# Patient Record
Sex: FEMALE | Race: AMERICAN INDIAN OR ALASKA NATIVE | NOT HISPANIC OR LATINO | Employment: UNEMPLOYED | URBAN - NONMETROPOLITAN AREA
[De-identification: names, ages, dates, MRNs, and addresses within clinical notes are randomized per-mention and may not be internally consistent; named-entity substitution may affect disease eponyms.]

---

## 2020-07-08 ENCOUNTER — TELEPHONE (OUTPATIENT)
Dept: FAMILY MEDICINE | Facility: OTHER | Age: 45
End: 2020-07-08

## 2020-07-08 NOTE — TELEPHONE ENCOUNTER
Pt's  called to confirm appt with Ana Latham, and would like her to know that pt will need to have nursing orders sent to [facility] in [place] [phone]. She stated most likely for a few times a week to help with medication management and then could go to once a week once patient becomes compliant. Patient went from living in a group home, to living with her daughter and is now living on her own.
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
## 2020-07-21 ENCOUNTER — ANCILLARY PROCEDURE (OUTPATIENT)
Dept: GENERAL RADIOLOGY | Facility: OTHER | Age: 45
End: 2020-07-21
Attending: NURSE PRACTITIONER
Payer: MEDICAID

## 2020-07-21 ENCOUNTER — OFFICE VISIT (OUTPATIENT)
Dept: FAMILY MEDICINE | Facility: OTHER | Age: 45
End: 2020-07-21
Attending: NURSE PRACTITIONER
Payer: MEDICAID

## 2020-07-21 VITALS
OXYGEN SATURATION: 98 % | DIASTOLIC BLOOD PRESSURE: 92 MMHG | HEIGHT: 67 IN | SYSTOLIC BLOOD PRESSURE: 140 MMHG | TEMPERATURE: 97.3 F | WEIGHT: 260 LBS | HEART RATE: 85 BPM | BODY MASS INDEX: 40.81 KG/M2

## 2020-07-21 DIAGNOSIS — M25.561 ACUTE PAIN OF RIGHT KNEE: ICD-10-CM

## 2020-07-21 DIAGNOSIS — Z87.898 HISTORY OF SEIZURES: ICD-10-CM

## 2020-07-21 DIAGNOSIS — Z76.89 ENCOUNTER FOR WEIGHT MANAGEMENT: ICD-10-CM

## 2020-07-21 DIAGNOSIS — Z76.89 ENCOUNTER TO ESTABLISH CARE: ICD-10-CM

## 2020-07-21 DIAGNOSIS — Z00.00 HEALTHCARE MAINTENANCE: ICD-10-CM

## 2020-07-21 DIAGNOSIS — F44.5 CONVERSION DISORDER WITH SEIZURES OR CONVULSIONS: Primary | Chronic | ICD-10-CM

## 2020-07-21 DIAGNOSIS — F10.10 ALCOHOL ABUSE: ICD-10-CM

## 2020-07-21 DIAGNOSIS — F34.1 DYSTHYMIA: ICD-10-CM

## 2020-07-21 PROBLEM — N87.0 CIN I (CERVICAL INTRAEPITHELIAL NEOPLASIA I): Status: ACTIVE | Noted: 2018-05-08

## 2020-07-21 PROBLEM — E66.01 MORBID OBESITY, UNSPECIFIED OBESITY TYPE (H): Status: ACTIVE | Noted: 2017-02-23

## 2020-07-21 PROCEDURE — 99214 OFFICE O/P EST MOD 30 MIN: CPT | Performed by: NURSE PRACTITIONER

## 2020-07-21 PROCEDURE — 87389 HIV-1 AG W/HIV-1&-2 AB AG IA: CPT | Mod: ZL | Performed by: NURSE PRACTITIONER

## 2020-07-21 PROCEDURE — G0463 HOSPITAL OUTPT CLINIC VISIT: HCPCS

## 2020-07-21 PROCEDURE — 36415 COLL VENOUS BLD VENIPUNCTURE: CPT | Mod: ZL | Performed by: NURSE PRACTITIONER

## 2020-07-21 PROCEDURE — 80177 DRUG SCRN QUAN LEVETIRACETAM: CPT | Mod: ZL | Performed by: NURSE PRACTITIONER

## 2020-07-21 PROCEDURE — 73562 X-RAY EXAM OF KNEE 3: CPT | Mod: TC,RT

## 2020-07-21 RX ORDER — IBUPROFEN 600 MG/1
TABLET, FILM COATED ORAL
COMMUNITY
Start: 2020-06-15

## 2020-07-21 RX ORDER — LEVETIRACETAM 1000 MG/1
TABLET ORAL
Qty: 180 TABLET | Refills: 0 | Status: SHIPPED | OUTPATIENT
Start: 2020-07-21 | End: 2020-12-08

## 2020-07-21 RX ORDER — TOPIRAMATE 50 MG/1
50 TABLET, FILM COATED ORAL 2 TIMES DAILY
Qty: 180 TABLET | Refills: 0 | Status: SHIPPED | OUTPATIENT
Start: 2020-07-21 | End: 2020-08-13

## 2020-07-21 RX ORDER — LEVOMILNACIPRAN HYDROCHLORIDE 20 MG/1
CAPSULE, EXTENDED RELEASE ORAL
COMMUNITY
Start: 2020-07-15 | End: 2020-07-21

## 2020-07-21 RX ORDER — TOPIRAMATE 50 MG/1
50 TABLET, FILM COATED ORAL 2 TIMES DAILY
COMMUNITY
End: 2020-07-21

## 2020-07-21 RX ORDER — LEVETIRACETAM 1000 MG/1
TABLET ORAL
COMMUNITY
Start: 2018-12-12 | End: 2020-07-21

## 2020-07-21 RX ORDER — MULTIPLE VITAMINS W/ MINERALS TAB 9MG-400MCG
1 TAB ORAL DAILY
Qty: 90 TABLET | Refills: 1 | Status: SHIPPED | OUTPATIENT
Start: 2020-07-21 | End: 2020-12-08

## 2020-07-21 RX ORDER — LAMOTRIGINE 150 MG/1
150 TABLET ORAL DAILY
COMMUNITY
End: 2020-12-08

## 2020-07-21 ASSESSMENT — ANXIETY QUESTIONNAIRES
6. BECOMING EASILY ANNOYED OR IRRITABLE: MORE THAN HALF THE DAYS
1. FEELING NERVOUS, ANXIOUS, OR ON EDGE: NEARLY EVERY DAY
3. WORRYING TOO MUCH ABOUT DIFFERENT THINGS: NEARLY EVERY DAY
IF YOU CHECKED OFF ANY PROBLEMS ON THIS QUESTIONNAIRE, HOW DIFFICULT HAVE THESE PROBLEMS MADE IT FOR YOU TO DO YOUR WORK, TAKE CARE OF THINGS AT HOME, OR GET ALONG WITH OTHER PEOPLE: VERY DIFFICULT
GAD7 TOTAL SCORE: 20
2. NOT BEING ABLE TO STOP OR CONTROL WORRYING: NEARLY EVERY DAY
7. FEELING AFRAID AS IF SOMETHING AWFUL MIGHT HAPPEN: NEARLY EVERY DAY
5. BEING SO RESTLESS THAT IT IS HARD TO SIT STILL: NEARLY EVERY DAY

## 2020-07-21 ASSESSMENT — PATIENT HEALTH QUESTIONNAIRE - PHQ9
SUM OF ALL RESPONSES TO PHQ QUESTIONS 1-9: 16
5. POOR APPETITE OR OVEREATING: NEARLY EVERY DAY

## 2020-07-21 ASSESSMENT — PAIN SCALES - GENERAL: PAINLEVEL: SEVERE PAIN (6)

## 2020-07-21 ASSESSMENT — MIFFLIN-ST. JEOR: SCORE: 1871.98

## 2020-07-21 NOTE — LETTER
July 23, 2020      Zeenat Blunt  [address] [place] [id number]        Dear ,    We are writing to inform you of your test results.        Resulted Orders   HIV Antigen Antibody Combo   Result Value Ref Range    HIV Antigen Antibody Combo Nonreactive NR^Nonreactive          Comment:      HIV-1 p24 Ag & HIV-1/HIV-2 Ab Not Detected       If you have any questions or concerns, please call the clinic at the number listed above.       Sincerely,        Ana Latham NP

## 2020-07-21 NOTE — PATIENT INSTRUCTIONS
Patient Education     Knee Pain  Knee pain is very common. It s especially common in active people who put a lot of pressure on their knees, like runners. It affects women more often than men.  Your kneecap (patella) is a thick, round bone. It covers and protects the front portion of your knee joint. It moves along a groove in your thighbone (femur) as part of the patellofemoral joint. A layer of cartilage surrounds the underside of your kneecap. This layer protects it from grinding against your femur.  When this cartilage softens and breaks down, it can cause knee pain. This is partly because of repetitive stress. The stress irritates the lining of the joint. This causes pain in the underlying bone.  What causes knee pain?  Many things can cause knee pain. You may have more than one cause. Some of these include:    Overuse of the knee joint    The kneecap doesn t line up with the tissue around it    Damage to small nerves in the area    Damage to the ligament-like structure that holds the kneecap in place (retinaculum)    Breakdown of the bone under the cartilage    Swelling in the soft tissues around the kneecap    Injury  You might be more likely to have knee pain if you:    Exercise a lot    Recently increased the intensity of your workouts    Have a body mass index (BMI) greater than 25    Have poor alignment of your kneecap    Walk with your feet turned overly outward or inward    Have weakness in surrounding muscle groups (inner quad or hip adductor muscles)    Have too much tightness in surrounding muscle groups (hamstrings or iliotibial band)    Have a recent history of injury to the area    Are female  Symptoms of knee pain  This type of knee pain is a dull, aching pain in the front of the knee in the area under and around the kneecap. This pain may start quickly or slowly. Your pain might be worse when you squat, run, or sit for a long time. Stairclimbing may be painful or difficult. You might also  sometimes feel like your knee is giving out. You may have symptoms in one or both of your knees.  Diagnosing knee pain  Your healthcare provider will ask about your medical history and your symptoms. Be sure to describe any activities that make your knee pain worse. He or she will look at your knee. This will include tests of your range of motion, strength, and areas of pain of your knee. Your knee alignment will be checked.  Your healthcare provider will need to rule out other causes of your knee pain, such as arthritis. You may need an imaging test, such as an X-ray or MRI.  Treatment for knee pain  Treatments that can help ease your symptoms may include:    Avoiding activities for a while that make your pain worse, returning to activity over time    Icing the outside of your knee when it causes you pain    Taking over-the-counter pain medicine    Wearing a knee brace or taping your knee to support it    Wearing special shoe inserts to help keep your feet in the proper alignment    Doing special exercises to stretch and strengthen the muscles around your hip and your knee  These steps help most people manage knee pain. But some cases of knee pain need to be treated with surgery. You rarely need surgery right away. You may need it later if other treatments don t work. Your healthcare provider may refer you to an orthopedic surgeon. He or she will talk with you about your choices.  Preventing knee pain  Losing weight and correcting excess muscle tightness or muscle weakness may help lower your risk.  In some cases, you can prevent knee pain. To help prevent a flare-up of knee pain, do these things:    Regularly do all the exercises your doctor or physical therapist advises    Support your knee as advised by your doctor or physical therapist    Increase training gradually, and ease up on training when needed    Have an expert check your gait for running or other sporting activities    Stretch properly before and  after exercise    Replace your running shoes regularly    Lose excess weight  When to call your healthcare provider  Call your healthcare provider right away if:    Your symptoms don t get better after a few weeks of treatment    You have any new symptoms  Date Last Reviewed: 4/1/2017 2000-2019 [employer]. [address] [place] [address]. All rights reserved. This information is not intended as a substitute for professional medical care. Always follow your healthcare professional's instructions.

## 2020-07-21 NOTE — LETTER
My Depression Action Plan  Name: Zeenat Blunt   Date of Birth 1975  Date: 7/20/2020    My doctor: Cricket Perry   My clinic: [facility] [place]  [address] [place] [address]  [phone]          GREEN    ZONE   Good Control    What it looks like:     Things are going generally well. You have normal ups and downs. You may even feel depressed from time to time, but bad moods usually last less than a day.   What you need to do:  1. Continue to care for yourself (see self care plan)  2. Check your depression survival kit and update it as needed  3. Follow your physician s recommendations including any medication.  4. Do not stop taking medication unless you consult with your physician first.           YELLOW         ZONE Getting Worse    What it looks like:     Depression is starting to interfere with your life.     It may be hard to get out of bed; you may be starting to isolate yourself from others.    Symptoms of depression are starting to last most all day and this has happened for several days.     You may have suicidal thoughts but they are not constant.   What you need to do:     1. Call your care team. Your response to treatment will improve if you keep your care team informed of your progress. Yellow periods are signs an adjustment may need to be made.     2. Continue your self-care.  Just get dressed and ready for the day.  Don't give yourself time to talk yourself out of it.    3. Talk to someone in your support network.    4. Open up your Depression Self-Care Plan/Wellness Kit.           RED    ZONE Medical Alert - Get Help    What it looks like:     Depression is seriously interfering with your life.     You may experience these or other symptoms: You can t get out of bed most days, can t work or engage in other necessary activities, you have trouble taking care of basic hygiene, or basic responsibilities, thoughts of suicide or death that will not go away,  self-injurious behavior.     What you need to do:  1. Call your care team and request a same-day appointment. If they are not available (weekends or after hours) call your local crisis line, emergency room or 911.            Depression Self-Care Plan / Wellness Kit    Self-Care for Depression  Here s the deal. Your body and mind are really not as separate as most people think.  What you do and think affects how you feel and how you feel influences what you do and think. This means if you do things that people who feel good do, it will help you feel better.  Sometimes this is all it takes.  There is also a place for medication and therapy depending on how severe your depression is, so be sure to consult with your medical provider and/ or Behavioral Health Consultant if your symptoms are worsening or not improving.     In order to better manage my stress, I will:    Exercise  Get some form of exercise, every day. This will help reduce pain and release endorphins, the  feel good  chemicals in your brain. This is almost as good as taking antidepressants!  This is not the same as joining a gym and then never going! (they count on that by the way ) It can be as simple as just going for a walk or doing some gardening, anything that will get you moving.      Hygiene   Maintain good hygiene (get out of bed in the morning, make your bed, brush your teeth, take a shower, and get dressed like you were going to work, even if you are unemployed).  If your clothes don't fit try to get ones that do.    Diet  Strive to eat foods that are good for me, drink plenty of water, and avoid excessive sugar, caffeine, alcohol, and other mood-altering substances.  Some foods that are helpful in depression are: complex carbohydrates, B vitamins, flaxseed, fish or fish oil, fresh fruits and vegetables.    Psychotherapy  Agree to participate in Individual Therapy (if recommended).    Medication  If prescribed medications, I agree to take them.   Missing doses can result in serious side effects.  I understand that drinking alcohol, or other illicit drug use, may cause potential side effects.  I will not stop my medication abruptly without first discussing it with my provider.    Staying Connected With Others  Stay in touch with my friends, family members, and my primary care provider/team.    Use your imagination  Be creative.  We all have a creative side; it doesn t matter if it s oil painting, sand castles, or mud pies! This will also kick up the endorphins.    Witness Beauty  (AKA stop and smell the roses) Take a look outside, even in mid-winter. Notice colors, textures. Watch the squirrels and birds.     Service to others  Be of service to others.  There is always someone else in need.  By helping others we can  get out of ourselves  and remember the really important things.  This also provides opportunities for practicing all the other parts of the program.    Humor  Laugh and be silly!  Adjust your TV habits for less news and crime-drama and more comedy.    Control your stress  Try breathing deep, massage therapy, biofeedback, and meditation. Find time to relax each day.     Crisis Text Line  http://www.crisistextline.org    The Crisis Text Line serves anyone, in any type of crisis, providing access to free, 24/7 support and information via the medium people already use and trust:    Here's how it works:  1.  Text 673-295 from anywhere in the [place], anytime, about any type of crisis.  2.  A live, trained Crisis Counselor receives the text and responds quickly.  3.  The volunteer Crisis Counselor will help you move from a 'hot moment to a cool moment'.    My support system    Clinic Contact:  Phone number:    Contact 1:  Phone number:    Contact 2:  Phone number:    Gnosticism/:  Phone number:    Therapist:  Phone number:    Local crisis center:    Phone number:    Other community support:  Phone number:

## 2020-07-21 NOTE — NURSING NOTE
"Chief Complaint   Patient presents with     Establish Care     Musculoskeletal Problem       Initial BP (!) 124/94 (BP Location: Left arm, Patient Position: Sitting, Cuff Size: Adult Large)   Pulse 85   Temp 97.3  F (36.3  C) (Tympanic)   Ht 1.702 m (5' 7\")   Wt 117.9 kg (260 lb)   SpO2 98%   BMI 40.72 kg/m   Estimated body mass index is 40.72 kg/m  as calculated from the following:    Height as of this encounter: 1.702 m (5' 7\").    Weight as of this encounter: 117.9 kg (260 lb).  Medication Reconciliation: complete  Jesika Strickland  "

## 2020-07-22 LAB
HIV 1+2 AB+HIV1 P24 AG SERPL QL IA: NONREACTIVE
LEVETIRACETAM SERPL-MCNC: <2 UG/ML (ref 12–46)

## 2020-07-22 ASSESSMENT — ANXIETY QUESTIONNAIRES: GAD7 TOTAL SCORE: 20

## 2020-07-29 ENCOUNTER — HOSPITAL ENCOUNTER (OUTPATIENT)
Dept: MRI IMAGING | Facility: HOSPITAL | Age: 45
Discharge: HOME | End: 2020-07-29
Attending: NURSE PRACTITIONER | Admitting: NURSE PRACTITIONER
Payer: MEDICAID

## 2020-07-29 DIAGNOSIS — M25.561 ACUTE PAIN OF RIGHT KNEE: ICD-10-CM

## 2020-07-29 PROCEDURE — 73721 MRI JNT OF LWR EXTRE W/O DYE: CPT | Mod: TC,RT

## 2020-07-30 ENCOUNTER — TELEPHONE (OUTPATIENT)
Dept: FAMILY MEDICINE | Facility: OTHER | Age: 45
End: 2020-07-30

## 2020-07-30 DIAGNOSIS — M25.561 ACUTE PAIN OF RIGHT KNEE: Primary | ICD-10-CM
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
## 2020-08-13 ENCOUNTER — APPOINTMENT (OUTPATIENT)
Dept: LAB | Facility: OTHER | Age: 45
End: 2020-08-13
Attending: NURSE PRACTITIONER
Payer: MEDICAID

## 2020-08-13 ENCOUNTER — OFFICE VISIT (OUTPATIENT)
Dept: FAMILY MEDICINE | Facility: OTHER | Age: 45
End: 2020-08-13
Attending: NURSE PRACTITIONER
Payer: MEDICAID

## 2020-08-13 VITALS
BODY MASS INDEX: 39.47 KG/M2 | SYSTOLIC BLOOD PRESSURE: 112 MMHG | OXYGEN SATURATION: 99 % | WEIGHT: 252 LBS | DIASTOLIC BLOOD PRESSURE: 82 MMHG | TEMPERATURE: 97.5 F | HEART RATE: 80 BPM

## 2020-08-13 DIAGNOSIS — Z00.00 ROUTINE GENERAL MEDICAL EXAMINATION AT A HEALTH CARE FACILITY: Primary | ICD-10-CM

## 2020-08-13 DIAGNOSIS — Z71.6 ENCOUNTER FOR TOBACCO USE CESSATION COUNSELING: ICD-10-CM

## 2020-08-13 DIAGNOSIS — Z91.148 POOR COMPLIANCE WITH MEDICATION: ICD-10-CM

## 2020-08-13 LAB
ALBUMIN SERPL-MCNC: 3.5 G/DL (ref 3.4–5)
ALBUMIN UR-MCNC: NEGATIVE MG/DL
ALP SERPL-CCNC: 88 U/L (ref 40–150)
ALT SERPL W P-5'-P-CCNC: 36 U/L (ref 0–50)
ANION GAP SERPL CALCULATED.3IONS-SCNC: 6 MMOL/L (ref 3–14)
APPEARANCE UR: CLEAR
AST SERPL W P-5'-P-CCNC: 27 U/L (ref 0–45)
BILIRUB SERPL-MCNC: 0.4 MG/DL (ref 0.2–1.3)
BILIRUB UR QL STRIP: NEGATIVE
BUN SERPL-MCNC: 8 MG/DL (ref 7–30)
C TRACH DNA SPEC QL NAA+PROBE: NOT DETECTED
CALCIUM SERPL-MCNC: 8.5 MG/DL (ref 8.5–10.1)
CHLORIDE SERPL-SCNC: 108 MMOL/L (ref 94–109)
CHOLEST SERPL-MCNC: 148 MG/DL
CO2 SERPL-SCNC: 23 MMOL/L (ref 20–32)
COLOR UR AUTO: NORMAL
CREAT SERPL-MCNC: 0.64 MG/DL (ref 0.52–1.04)
ERYTHROCYTE [DISTWIDTH] IN BLOOD BY AUTOMATED COUNT: 16.3 % (ref 10–15)
GFR SERPL CREATININE-BSD FRML MDRD: >90 ML/MIN/{1.73_M2}
GLUCOSE SERPL-MCNC: 115 MG/DL (ref 70–99)
GLUCOSE UR STRIP-MCNC: NEGATIVE MG/DL
HCT VFR BLD AUTO: 43.2 % (ref 35–47)
HDLC SERPL-MCNC: 53 MG/DL
HGB BLD-MCNC: 13.2 G/DL (ref 11.7–15.7)
HGB UR QL STRIP: NEGATIVE
KETONES UR STRIP-MCNC: NEGATIVE MG/DL
LDLC SERPL CALC-MCNC: 80 MG/DL
LEUKOCYTE ESTERASE UR QL STRIP: NEGATIVE
MCH RBC QN AUTO: 25 PG (ref 26.5–33)
MCHC RBC AUTO-ENTMCNC: 30.6 G/DL (ref 31.5–36.5)
MCV RBC AUTO: 82 FL (ref 78–100)
N GONORRHOEA DNA SPEC QL NAA+PROBE: NOT DETECTED
NITRATE UR QL: NEGATIVE
NONHDLC SERPL-MCNC: 95 MG/DL
PH UR STRIP: 5.5 PH (ref 4.7–8)
PLATELET # BLD AUTO: 308 10E9/L (ref 150–450)
POTASSIUM SERPL-SCNC: 4.6 MMOL/L (ref 3.4–5.3)
PROT SERPL-MCNC: 7.6 G/DL (ref 6.8–8.8)
RBC # BLD AUTO: 5.28 10E12/L (ref 3.8–5.2)
SODIUM SERPL-SCNC: 137 MMOL/L (ref 133–144)
SOURCE: NORMAL
SP GR UR STRIP: 1.02 (ref 1–1.03)
SPECIMEN SOURCE: NORMAL
SPECIMEN SOURCE: NORMAL
TRIGL SERPL-MCNC: 75 MG/DL
TSH SERPL DL<=0.005 MIU/L-ACNC: 1.82 MU/L (ref 0.4–4)
UROBILINOGEN UR STRIP-MCNC: 2 MG/DL (ref 0–2)
WBC # BLD AUTO: 7.7 10E9/L (ref 4–11)
WET PREP SPEC: NORMAL

## 2020-08-13 PROCEDURE — 99396 PREV VISIT EST AGE 40-64: CPT | Performed by: NURSE PRACTITIONER

## 2020-08-13 PROCEDURE — 87624 HPV HI-RISK TYP POOLED RSLT: CPT | Mod: ZL | Performed by: NURSE PRACTITIONER

## 2020-08-13 PROCEDURE — 81003 URINALYSIS AUTO W/O SCOPE: CPT | Mod: ZL | Performed by: NURSE PRACTITIONER

## 2020-08-13 PROCEDURE — 87210 SMEAR WET MOUNT SALINE/INK: CPT | Mod: ZL | Performed by: NURSE PRACTITIONER

## 2020-08-13 PROCEDURE — 87591 N.GONORRHOEAE DNA AMP PROB: CPT | Mod: ZL | Performed by: NURSE PRACTITIONER

## 2020-08-13 PROCEDURE — 80061 LIPID PANEL: CPT | Mod: ZL | Performed by: NURSE PRACTITIONER

## 2020-08-13 PROCEDURE — 87491 CHLMYD TRACH DNA AMP PROBE: CPT | Mod: ZL | Performed by: NURSE PRACTITIONER

## 2020-08-13 PROCEDURE — 80053 COMPREHEN METABOLIC PANEL: CPT | Mod: ZL | Performed by: NURSE PRACTITIONER

## 2020-08-13 PROCEDURE — 36415 COLL VENOUS BLD VENIPUNCTURE: CPT | Mod: ZL | Performed by: NURSE PRACTITIONER

## 2020-08-13 PROCEDURE — G0123 SCREEN CERV/VAG THIN LAYER: HCPCS | Mod: ZL | Performed by: NURSE PRACTITIONER

## 2020-08-13 PROCEDURE — 85027 COMPLETE CBC AUTOMATED: CPT | Mod: ZL | Performed by: NURSE PRACTITIONER

## 2020-08-13 PROCEDURE — 84443 ASSAY THYROID STIM HORMONE: CPT | Mod: ZL | Performed by: NURSE PRACTITIONER

## 2020-08-13 PROCEDURE — G0476 HPV COMBO ASSAY CA SCREEN: HCPCS | Mod: ZL | Performed by: NURSE PRACTITIONER

## 2020-08-13 ASSESSMENT — PAIN SCALES - GENERAL: PAINLEVEL: SEVERE PAIN (7)

## 2020-08-13 NOTE — NURSING NOTE
"Chief Complaint   Patient presents with     Physical       Initial /82   Pulse 80   Temp 97.5  F (36.4  C)   Wt 114.3 kg (252 lb)   SpO2 99%   BMI 39.47 kg/m   Estimated body mass index is 39.47 kg/m  as calculated from the following:    Height as of 7/21/20: 1.702 m (5' 7\").    Weight as of this encounter: 114.3 kg (252 lb).  Medication Reconciliation: complete  Jesika Strickland  "

## 2020-08-19 ENCOUNTER — MEDICAL CORRESPONDENCE (OUTPATIENT)
Dept: HEALTH INFORMATION MANAGEMENT | Facility: CLINIC | Age: 45
End: 2020-08-19

## 2020-08-20 ENCOUNTER — HOSPITAL ENCOUNTER (OUTPATIENT)
Dept: ULTRASOUND IMAGING | Facility: HOSPITAL | Age: 45
End: 2020-08-20
Attending: NURSE PRACTITIONER
Payer: MEDICAID

## 2020-08-20 ENCOUNTER — HOSPITAL ENCOUNTER (OUTPATIENT)
Dept: MAMMOGRAPHY | Facility: OTHER | Age: 45
End: 2020-08-20
Attending: NURSE PRACTITIONER
Payer: MEDICAID

## 2020-08-20 DIAGNOSIS — B37.31 YEAST INFECTION OF THE VAGINA: Primary | ICD-10-CM

## 2020-08-20 DIAGNOSIS — N63.20 LEFT BREAST LUMP: ICD-10-CM

## 2020-08-20 DIAGNOSIS — Z00.00 ROUTINE GENERAL MEDICAL EXAMINATION AT A HEALTH CARE FACILITY: ICD-10-CM

## 2020-08-20 LAB
COPATH REPORT: NORMAL
PAP: NORMAL

## 2020-08-20 PROCEDURE — 76642 ULTRASOUND BREAST LIMITED: CPT | Mod: TC,LT

## 2020-08-20 PROCEDURE — 77066 DX MAMMO INCL CAD BI: CPT | Mod: TC

## 2020-08-20 RX ORDER — FLUCONAZOLE 150 MG/1
150 TABLET ORAL ONCE
Qty: 1 TABLET | Refills: 0 | Status: SHIPPED | OUTPATIENT
Start: 2020-08-20 | End: 2020-08-20

## 2020-08-21 LAB
FINAL DIAGNOSIS: NORMAL
HPV HR 12 DNA CVX QL NAA+PROBE: NEGATIVE
HPV16 DNA SPEC QL NAA+PROBE: NEGATIVE
HPV18 DNA SPEC QL NAA+PROBE: NEGATIVE
SPECIMEN DESCRIPTION: NORMAL
SPECIMEN SOURCE CVX/VAG CYTO: NORMAL

## 2020-10-13 ENCOUNTER — TRANSFERRED RECORDS (OUTPATIENT)
Dept: HEALTH INFORMATION MANAGEMENT | Facility: CLINIC | Age: 45
End: 2020-10-13

## 2020-11-10 ENCOUNTER — TELEPHONE (OUTPATIENT)
Dept: FAMILY MEDICINE | Facility: OTHER | Age: 45
End: 2020-11-10

## 2020-12-02 NOTE — PROGRESS NOTES
Subjective     Zeenat Blunt is a 45 year old female who presents to clinic today for the following health issues:    HPI        Musculoskeletal problem/pain  Onset/Duration: on going for years   Description  Location: knee - bilateral. Right is worse   Joint Swelling: YES  Redness: no  Pain: YES  Warmth: no  Intensity:  7/10  Progression of Symptoms:  worsening  Accompanying signs and symptoms:   Fevers: no  Numbness/tingling/weakness: YES, numbness   History  Trauma to the area: YES- has had some falls in the past.   Recent illness:  no  Previous similar problem: YES  Previous evaluation:  YES. Right knee x ray and right knee mri in July 2020   Precipitating or alleviating factors:  Aggravating factors include: standing, walking, climbing stairs, exercise and overuse  Therapies tried and outcome: ice, stretching, exercises, support wrap, acetaminophen, Ibuprofen and deep heating rub- brace that she borrowed from someone seemed to help but she had to give it back.     Referral for OA ordered 7-. Pain has continued to increase. She can call to schedule.     She fell on both of her knees in the past month. Her left knee is painful, but right knee is worse. Denies other injury from fall. She landed directly on her knees.    Face to face for homecare nursing       Duration: on going     Description (location/character/radiation): weekly visits.    Intensity:  Na     Accompanying signs and symptoms: medication set up . Check ins     History (similar episodes/previous evaluation): has had nursing visits for the last 3 months .     Precipitating or alleviating factors: None    Therapies tried and outcome: None    She needs a refill of medications. She has been out for 1 week.     She would like to have her medications filled at [facility] and medications to be in a bubble pack. She will get a 2 week fill from pharmacy today. The rest of her medications will be sent to her in a bubble pack    She needs a new  referral for Home Care. She was receiving home care from [facility], but she stopped answering the door for them from the faxes that I received from [facility]        Review of Systems   Constitutional, HEENT, cardiovascular, pulmonary, gi and gu systems are negative, except as otherwise noted.      Objective    /82   Pulse 90   Temp 97.8  F (36.6  C)   Wt 107.5 kg (237 lb)   SpO2 96%   BMI 37.12 kg/m    Body mass index is 37.12 kg/m .  Physical Exam   GENERAL: healthy, alert and no distress  RESP: lungs clear to auscultation - no rales, rhonchi or wheezes  CV: regular rate and rhythm, normal S1 S2, no S3 or S4, no murmur, click or rub, no peripheral edema and peripheral pulses strong  MS: no gross musculoskeletal defects noted, no edema. CMS and ROM intact to lower extremities. Distal pulses intact. Left knee with negative anterior/posterir drawer sign. Negative Lachman's. Negative valgus/varus sign. Generalized knee discomfort. No warmth, rash, erythema, or warmth to the touch to bilateral knees  SKIN: no suspicious lesions or rashes  NEURO: Normal strength and tone, mentation intact and speech normal  PSYCH: mentation appears normal, affect normal/bright    Results for orders placed or performed in visit on 12/08/20   XR Knee Left 3 Views (Clinic Performed)     Status: None    Narrative    PROCEDURE: XR KNEE LT 3 VW 12/8/2020 3:04 PM    HISTORY: Acute pain of left knee    COMPARISONS: None.    TECHNIQUE: 3 views.    FINDINGS: No acute fracture or dislocation is seen. There is no  significant joint effusion.    There is mild narrowing of the medial joint space. There is narrowing  of the patellofemoral joint with posterior patellar osteophytes. No  focal bone lesion is seen.         Impression    IMPRESSION: Degenerative change without acute abnormality.    JELANI HANSEN MD       Assessment & Plan      She will call OA to schedule an appointment. She will follow up in clinic as needed.      (M25.562) Acute pain of left knee  (primary encounter diagnosis)  Comment: check XRAY  Plan: XR Knee Left 3 Views (Clinic Performed)            (Z87.898) History of seizures  Comment: RF. Check Keppra level  Plan: levETIRAcetam (KEPPRA) 1000 MG tablet,         topiramate (TOPAMAX) 50 MG tablet, Keppra         (Levetiracetam) Level            (Z71.6) Encounter for tobacco use cessation counseling  Comment: RF  Plan: nicotine (NICORETTE) 2 MG gum            (F34.1) Dysthymia  Comment: RF  Plan: cloNIDine (CATAPRES) 0.1 MG tablet, lamoTRIgine        (LAMICTAL) 150 MG tablet, levomilnacipran         (FETZIMA) 40 MG 24 hr capsule            (Z91.14) Poor compliance with medication  Comment: referral ordered  Plan: HOME CARE NURSING REFERRAL        See Patient Instructions    Return if symptoms worsen or fail to improve.    Ana Latham NP  [facility] - [place]

## 2020-12-08 ENCOUNTER — OFFICE VISIT (OUTPATIENT)
Dept: FAMILY MEDICINE | Facility: OTHER | Age: 45
End: 2020-12-08
Attending: NURSE PRACTITIONER
Payer: MEDICAID

## 2020-12-08 ENCOUNTER — ANCILLARY PROCEDURE (OUTPATIENT)
Dept: GENERAL RADIOLOGY | Facility: OTHER | Age: 45
End: 2020-12-08
Attending: NURSE PRACTITIONER
Payer: MEDICAID

## 2020-12-08 VITALS
BODY MASS INDEX: 37.12 KG/M2 | OXYGEN SATURATION: 96 % | TEMPERATURE: 97.8 F | WEIGHT: 237 LBS | DIASTOLIC BLOOD PRESSURE: 82 MMHG | SYSTOLIC BLOOD PRESSURE: 124 MMHG | HEART RATE: 90 BPM

## 2020-12-08 DIAGNOSIS — M25.562 ACUTE PAIN OF LEFT KNEE: ICD-10-CM

## 2020-12-08 DIAGNOSIS — Z71.6 ENCOUNTER FOR TOBACCO USE CESSATION COUNSELING: ICD-10-CM

## 2020-12-08 DIAGNOSIS — Z87.898 HISTORY OF SEIZURES: ICD-10-CM

## 2020-12-08 DIAGNOSIS — M25.562 ACUTE PAIN OF LEFT KNEE: Primary | ICD-10-CM

## 2020-12-08 DIAGNOSIS — F34.1 DYSTHYMIA: ICD-10-CM

## 2020-12-08 DIAGNOSIS — Z91.148 POOR COMPLIANCE WITH MEDICATION: ICD-10-CM

## 2020-12-08 PROCEDURE — 80177 DRUG SCRN QUAN LEVETIRACETAM: CPT | Mod: ZL | Performed by: NURSE PRACTITIONER

## 2020-12-08 PROCEDURE — G0463 HOSPITAL OUTPT CLINIC VISIT: HCPCS

## 2020-12-08 PROCEDURE — 36415 COLL VENOUS BLD VENIPUNCTURE: CPT | Mod: ZL | Performed by: NURSE PRACTITIONER

## 2020-12-08 PROCEDURE — 73562 X-RAY EXAM OF KNEE 3: CPT | Mod: TC,LT

## 2020-12-08 PROCEDURE — 99214 OFFICE O/P EST MOD 30 MIN: CPT | Performed by: NURSE PRACTITIONER

## 2020-12-08 RX ORDER — CLONIDINE HYDROCHLORIDE 0.1 MG/1
TABLET ORAL
COMMUNITY
Start: 2020-03-06 | End: 2020-12-08

## 2020-12-08 RX ORDER — LEVETIRACETAM 1000 MG/1
TABLET ORAL
Qty: 180 TABLET | Refills: 0 | Status: SHIPPED | OUTPATIENT
Start: 2020-12-08 | End: 2021-01-06

## 2020-12-08 RX ORDER — TOPIRAMATE 50 MG/1
TABLET, FILM COATED ORAL
Qty: 90 TABLET | Refills: 1 | Status: SHIPPED | OUTPATIENT
Start: 2020-12-08 | End: 2021-02-22

## 2020-12-08 RX ORDER — LAMOTRIGINE 150 MG/1
150 TABLET ORAL DAILY
Qty: 90 TABLET | Refills: 1 | Status: SHIPPED | OUTPATIENT
Start: 2020-12-08 | End: 2021-05-19

## 2020-12-08 RX ORDER — CLONIDINE HYDROCHLORIDE 0.1 MG/1
TABLET ORAL
Qty: 180 TABLET | Refills: 1 | Status: SHIPPED | OUTPATIENT
Start: 2020-12-08 | End: 2021-05-19

## 2020-12-08 RX ORDER — TOPIRAMATE 50 MG/1
TABLET, FILM COATED ORAL
COMMUNITY
Start: 2020-08-28 | End: 2020-12-08

## 2020-12-08 ASSESSMENT — PAIN SCALES - GENERAL: PAINLEVEL: SEVERE PAIN (7)

## 2020-12-08 NOTE — PATIENT INSTRUCTIONS
Patient Education     Knee Pain  Knee pain is very common. It s especially common in active people who put a lot of pressure on their knees, like runners. It affects women more often than men.  Your kneecap (patella) is a thick, round bone. It covers and protects the front portion of your knee joint. It moves along a groove in your thighbone (femur) as part of the patellofemoral joint. A layer of cartilage surrounds the underside of your kneecap. This layer protects it from grinding against your femur.  When this cartilage softens and breaks down, it can cause knee pain. This is partly because of repetitive stress. The stress irritates the lining of the joint. This causes pain in the underlying bone.  What causes knee pain?  Many things can cause knee pain. You may have more than one cause. Some of these include:    Overuse of the knee joint    The kneecap doesn t line up with the tissue around it    Damage to small nerves in the area    Damage to the ligament-like structure that holds the kneecap in place (retinaculum)    Breakdown of the bone under the cartilage    Swelling in the soft tissues around the kneecap    Injury  You might be more likely to have knee pain if you:    Exercise a lot    Recently increased the intensity of your workouts    Have a body mass index (BMI) greater than 25    Have poor alignment of your kneecap    Walk with your feet turned overly outward or inward    Have weakness in surrounding muscle groups (inner quad or hip adductor muscles)    Have too much tightness in surrounding muscle groups (hamstrings or iliotibial band)    Have a recent history of injury to the area    Are female  Symptoms of knee pain  This type of knee pain is a dull, aching pain in the front of the knee in the area under and around the kneecap. This pain may start quickly or slowly. Your pain might be worse when you squat, run, or sit for a long time. Climbing stairs may be painful or hard to do. You might also  sometimes feel like your knee is giving out. You may have symptoms in one or both of your knees.  Diagnosing knee pain  Your healthcare provider will ask about your medical history and your symptoms. Be sure to describe any activities that make your knee pain worse. He or she will look at your knee. This will include tests of your range of motion, strength, and areas of pain of your knee. Your knee alignment will be checked.  Your healthcare provider will need to rule out other causes of your knee pain, such as arthritis. You may need an imaging test, such as an X-ray or MRI.  Treatment for knee pain  Treatments that can help ease your symptoms may include:    Avoiding activities for a while that make your pain worse, returning to activity over time    Icing the outside of your knee when it causes you pain    Taking over-the-counter pain medicine such as NSAIDs    Wearing a knee brace or taping your knee to support it    Compression to help prevent swelling    Wearing special shoe inserts to help keep your feet in the proper alignment    Elevating your knee    Doing special exercises to stretch and strengthen the muscles around your hip and your knee  These steps help most people manage knee pain. But some cases of knee pain need to be treated with surgery. You rarely need surgery right away. You may need it later if other treatments don t work. Your healthcare provider may refer you to an orthopedic surgeon. He or she will talk with you about your choices.  Preventing knee pain  Losing weight and correcting excess muscle tightness or muscle weakness may help lower your risk.  In some cases, you can prevent knee pain. To help prevent a flare-up of knee pain, do these things:    Regularly do all the exercises your doctor or physical therapist advises    Warm up fully before exercising    Support your knee as advised by your doctor or physical therapist    Increase training gradually, and ease up on training when  needed    Have an expert check your gait for running or other sporting activities    Stretch properly before and after exercise    Replace your running shoes regularly    Lose excess weight  When to call your healthcare provider  Call your healthcare provider right away if:    Your symptoms don t get better after a few weeks of treatment    You have any new symptoms  [employer] last reviewed this educational content on 6/1/2019 2000-2020 [employer]. [address] [place] [address]. All rights reserved. This information is not intended as a substitute for professional medical care. Always follow your healthcare professional's instructions.    Call [facility] to schedule on appointment for knee pain.   Follow up as needed.

## 2020-12-08 NOTE — NURSING NOTE
"Chief Complaint   Patient presents with     Musculoskeletal Problem     bilateral knees     Clinic Care Coordination - Face To Face     home care nursing        Initial /82   Pulse 90   Temp 97.8  F (36.6  C)   Wt 107.5 kg (237 lb)   SpO2 96%   BMI 37.12 kg/m   Estimated body mass index is 37.12 kg/m  as calculated from the following:    Height as of 7/21/20: 1.702 m (5' 7\").    Weight as of this encounter: 107.5 kg (237 lb).  Medication Reconciliation: complete  Jesika Strickland  "

## 2020-12-09 ENCOUNTER — TELEPHONE (OUTPATIENT)
Dept: FAMILY MEDICINE | Facility: OTHER | Age: 45
End: 2020-12-09

## 2020-12-09 NOTE — TELEPHONE ENCOUNTER
Pt calling and needs referral for surgery for right knee.She would prefer [facility].    Please advise.    Call back [phone]    Leora Triplett RN

## 2020-12-09 NOTE — TELEPHONE ENCOUNTER
Received a PA from [facility] for Fetzima 40mg er capsules. Submitted on CMM. Waiting for a response.

## 2020-12-10 NOTE — TELEPHONE ENCOUNTER
Referral was sent to OA in July, phone number given for OA to schedule appt.  She will call back if she has any problems scheduling.

## 2020-12-10 NOTE — TELEPHONE ENCOUNTER
Received a DENIAL from [facility] for Fetzima 40mg er capsules.     Forms scanned to Epic.

## 2020-12-11 LAB — LEVETIRACETAM SERPL-MCNC: <2 UG/ML (ref 12–46)

## 2021-02-22 DIAGNOSIS — Z87.898 HISTORY OF SEIZURES: ICD-10-CM

## 2021-02-22 RX ORDER — TOPIRAMATE 50 MG/1
TABLET, FILM COATED ORAL
Qty: 56 TABLET | Refills: 0 | Status: SHIPPED | OUTPATIENT
Start: 2021-02-22 | End: 2021-03-29

## 2021-02-22 NOTE — TELEPHONE ENCOUNTER
Topamax  50mg      Last Written Prescription Date:  12/8/20  Last Fill Quantity: 90,   # refills: 1  Last Office Visit: 12/8/20  Future Office visit:       Routing refill request to provider for review/approval because:

## 2021-03-29 DIAGNOSIS — Z87.898 HISTORY OF SEIZURES: ICD-10-CM

## 2021-03-29 RX ORDER — TOPIRAMATE 50 MG/1
TABLET, FILM COATED ORAL
Qty: 56 TABLET | Refills: 0 | Status: SHIPPED | OUTPATIENT
Start: 2021-03-29 | End: 2021-04-27

## 2021-03-29 NOTE — TELEPHONE ENCOUNTER
topamax 50 mg      Last Written Prescription Date:  2-  Last Fill Quantity: 56,   # refills: 0  Last Office Visit: 12-8-2020

## 2021-06-08 RX ORDER — MULTIVITAMIN/IRON/FOLIC ACID 18MG-0.4MG
TABLET ORAL
COMMUNITY
Start: 2021-04-28 | End: 2021-06-14

## 2021-06-08 RX ORDER — LEVOMILNACIPRAN HYDROCHLORIDE 80 MG/1
CAPSULE, EXTENDED RELEASE ORAL
COMMUNITY
Start: 2021-04-28 | End: 2021-07-15

## 2021-06-08 RX ORDER — ZALEPLON 10 MG/1
CAPSULE ORAL
COMMUNITY
Start: 2021-04-26

## 2021-06-08 NOTE — PROGRESS NOTES
"    Assessment & Plan     No DVT to left lower extremity. Treat for cellulitis. Baseline CBC obtained.     (M79.605) Pain of left lower extremity  (primary encounter diagnosis)  Comment: rule out DVT  Plan: US Lower Extremity Venous Duplex Left            (L03.116) Left leg cellulitis  Comment: treat for cellulitis. Marked area of erythema. Check CBC for baseline  Plan: doxycycline hyclate (VIBRAMYCIN) 100 MG         capsule, CBC with platelets and differential               BMI:   Estimated body mass index is 37.28 kg/m  as calculated from the following:    Height as of this encounter: 1.702 m (5' 7\").    Weight as of this encounter: 108 kg (238 lb).   Weight management plan: Discussed healthy diet and exercise guidelines    See Patient Instructions    Return if symptoms worsen or fail to improve.    Ana Latham NP  [facility] - [place] Epperson is a 45 year old who presents for the following health issues     HPI   Musculoskeletal problem/pain      Duration:3 days    Description  Location: left leg     Intensity:  4/10    Accompanying signs and symptoms: redness. Tender to the touch    History  Previous similar problem: no   Previous evaluation:  none    Precipitating or alleviating factors:  Trauma or overuse: no   Aggravating factors include: standing up from sitting    Therapies tried and outcome: nothing    Concerned for blood clot    Feels like a lot of pressure in her leg    Denies fever, chills, or night sweats         Review of Systems   Constitutional, HEENT, cardiovascular, pulmonary, gi and gu systems are negative, except as otherwise noted.      Objective    /78   Pulse 82   Temp 98.2  F (36.8  C) (Tympanic)   Ht 1.702 m (5' 7\")   Wt 108 kg (238 lb)   SpO2 98%   BMI 37.28 kg/m    Body mass index is 37.28 kg/m .  Physical Exam  Musculoskeletal:        Legs:         GENERAL: healthy, alert and no distress  RESP: lungs clear to auscultation - no rales, rhonchi " or wheezes  CV: regular rate and rhythm, normal S1 S2, no S3 or S4, no murmur, click or rub, no peripheral edema and peripheral pulses strong  MS: no gross musculoskeletal defects noted, no edema. CMS and ROM intact to left lower extremity. Left dorsalis pedis +2. TTP to left lateral thigh and behind left knee. Veins more distended on left leg. Marked area of erythema 5 X 1.5 inches. Erythema is warmth to the touch without discharge   SKIN: no suspicious lesions or rashes  NEURO: Normal strength and tone, mentation intact and speech normal  PSYCH: mentation appears normal, affect normal/bright    Results for orders placed or performed during the hospital encounter of 06/10/21   US Lower Extremity Venous Duplex Left     Status: None    Narrative    Exam:US LOWER EXTREMITY VENOUS DUPLEX LEFT    History: Left leg pain    Comparisons: None    Technique: Venous duplex ultrasonography of the left lower extremity  was performed.     Findings: The common femoral vein, superficial femoral vein and  popliteal vein are fully compressible with spontaneous and augmentable  venous flow.           Impression    Impression: No evidence of deep venous thrombosis within the left  lower extremity.    MARÍA ELENA KUMAR MD

## 2021-06-10 ENCOUNTER — HOSPITAL ENCOUNTER (OUTPATIENT)
Dept: ULTRASOUND IMAGING | Facility: HOSPITAL | Age: 46
End: 2021-06-10
Attending: NURSE PRACTITIONER
Payer: MEDICAID

## 2021-06-10 ENCOUNTER — OFFICE VISIT (OUTPATIENT)
Dept: FAMILY MEDICINE | Facility: OTHER | Age: 46
End: 2021-06-10
Attending: NURSE PRACTITIONER
Payer: MEDICAID

## 2021-06-10 VITALS
DIASTOLIC BLOOD PRESSURE: 78 MMHG | WEIGHT: 238 LBS | SYSTOLIC BLOOD PRESSURE: 118 MMHG | OXYGEN SATURATION: 98 % | TEMPERATURE: 98.2 F | HEIGHT: 67 IN | HEART RATE: 82 BPM | BODY MASS INDEX: 37.35 KG/M2

## 2021-06-10 DIAGNOSIS — M79.605 PAIN OF LEFT LOWER EXTREMITY: ICD-10-CM

## 2021-06-10 DIAGNOSIS — M79.605 PAIN OF LEFT LOWER EXTREMITY: Primary | ICD-10-CM

## 2021-06-10 DIAGNOSIS — L03.116 LEFT LEG CELLULITIS: ICD-10-CM

## 2021-06-10 PROBLEM — F44.81 DISSOCIATIVE IDENTITY DISORDER (H): Status: ACTIVE | Noted: 2020-12-10

## 2021-06-10 LAB
BASOPHILS # BLD AUTO: 0.1 10E9/L (ref 0–0.2)
BASOPHILS NFR BLD AUTO: 0.6 %
DIFFERENTIAL METHOD BLD: ABNORMAL
EOSINOPHIL # BLD AUTO: 0.2 10E9/L (ref 0–0.7)
EOSINOPHIL NFR BLD AUTO: 2.5 %
ERYTHROCYTE [DISTWIDTH] IN BLOOD BY AUTOMATED COUNT: 15.9 % (ref 10–15)
HCT VFR BLD AUTO: 42.4 % (ref 35–47)
HGB BLD-MCNC: 13.4 G/DL (ref 11.7–15.7)
IMM GRANULOCYTES # BLD: 0 10E9/L (ref 0–0.4)
IMM GRANULOCYTES NFR BLD: 0.2 %
LYMPHOCYTES # BLD AUTO: 1.7 10E9/L (ref 0.8–5.3)
LYMPHOCYTES NFR BLD AUTO: 20.6 %
MCH RBC QN AUTO: 26.3 PG (ref 26.5–33)
MCHC RBC AUTO-ENTMCNC: 31.6 G/DL (ref 31.5–36.5)
MCV RBC AUTO: 83 FL (ref 78–100)
MONOCYTES # BLD AUTO: 0.7 10E9/L (ref 0–1.3)
MONOCYTES NFR BLD AUTO: 8.6 %
NEUTROPHILS # BLD AUTO: 5.5 10E9/L (ref 1.6–8.3)
NEUTROPHILS NFR BLD AUTO: 67.5 %
NRBC # BLD AUTO: 0 10*3/UL
NRBC BLD AUTO-RTO: 0 /100
PLATELET # BLD AUTO: 232 10E9/L (ref 150–450)
RBC # BLD AUTO: 5.1 10E12/L (ref 3.8–5.2)
WBC # BLD AUTO: 8.2 10E9/L (ref 4–11)

## 2021-06-10 PROCEDURE — 36415 COLL VENOUS BLD VENIPUNCTURE: CPT | Mod: ZL | Performed by: NURSE PRACTITIONER

## 2021-06-10 PROCEDURE — 85025 COMPLETE CBC W/AUTO DIFF WBC: CPT | Mod: ZL | Performed by: NURSE PRACTITIONER

## 2021-06-10 PROCEDURE — 93971 EXTREMITY STUDY: CPT | Mod: LT

## 2021-06-10 PROCEDURE — G0463 HOSPITAL OUTPT CLINIC VISIT: HCPCS | Mod: 25

## 2021-06-10 PROCEDURE — 99214 OFFICE O/P EST MOD 30 MIN: CPT | Performed by: NURSE PRACTITIONER

## 2021-06-10 RX ORDER — DOXYCYCLINE 100 MG/1
100 CAPSULE ORAL 2 TIMES DAILY
Qty: 20 CAPSULE | Refills: 0 | Status: SHIPPED | OUTPATIENT
Start: 2021-06-10 | End: 2021-06-20

## 2021-06-10 ASSESSMENT — ANXIETY QUESTIONNAIRES
4. TROUBLE RELAXING: NEARLY EVERY DAY
GAD7 TOTAL SCORE: 12
3. WORRYING TOO MUCH ABOUT DIFFERENT THINGS: SEVERAL DAYS
6. BECOMING EASILY ANNOYED OR IRRITABLE: SEVERAL DAYS
7. FEELING AFRAID AS IF SOMETHING AWFUL MIGHT HAPPEN: SEVERAL DAYS
IF YOU CHECKED OFF ANY PROBLEMS ON THIS QUESTIONNAIRE, HOW DIFFICULT HAVE THESE PROBLEMS MADE IT FOR YOU TO DO YOUR WORK, TAKE CARE OF THINGS AT HOME, OR GET ALONG WITH OTHER PEOPLE: VERY DIFFICULT
2. NOT BEING ABLE TO STOP OR CONTROL WORRYING: MORE THAN HALF THE DAYS
5. BEING SO RESTLESS THAT IT IS HARD TO SIT STILL: MORE THAN HALF THE DAYS
1. FEELING NERVOUS, ANXIOUS, OR ON EDGE: MORE THAN HALF THE DAYS

## 2021-06-10 ASSESSMENT — MIFFLIN-ST. JEOR: SCORE: 1767.19

## 2021-06-10 ASSESSMENT — PATIENT HEALTH QUESTIONNAIRE - PHQ9: SUM OF ALL RESPONSES TO PHQ QUESTIONS 1-9: 11

## 2021-06-10 ASSESSMENT — PAIN SCALES - GENERAL: PAINLEVEL: MODERATE PAIN (4)

## 2021-06-10 NOTE — NURSING NOTE
"Chief Complaint   Patient presents with     Leg Pain     left leg       Initial /78   Pulse 82   Temp 98.2  F (36.8  C) (Tympanic)   Ht 1.702 m (5' 7\")   Wt 108 kg (238 lb)   SpO2 98%   BMI 37.28 kg/m   Estimated body mass index is 37.28 kg/m  as calculated from the following:    Height as of this encounter: 1.702 m (5' 7\").    Weight as of this encounter: 108 kg (238 lb).  Medication Reconciliation: complete  Doroteo Bruno LPN  "

## 2021-06-10 NOTE — PATIENT INSTRUCTIONS
Patient Education     Discharge Instructions for Cellulitis   You have been diagnosed with cellulitis. This is an infection in the deepest layer of the skin and tissue beneath the skin. In some cases, the infection also affects the muscle. Cellulitis is caused by bacteria. The bacteria can enter the body through broken skin. This can happen with a cut, scratch, animal bite, or an insect bite that has been scratched. You may have been treated in the hospital with antibiotics and fluids. You will likely be given a prescription for antibiotics to take at home. This sheet will help you take care of yourself at home.  Home care  When you are home:    Take the prescribed antibiotic medicine you are given as directed until it is gone. Take it even if you feel better. It treats the infection and stops it from returning. Not taking all the medicine can make future infections hard to treat.    Keep the infected area clean.    When possible, raise the infected area above the level of your heart. This helps keep swelling down.    Talk with your healthcare provider if you are in pain. Ask what kind of over-the-counter medicine you can take for pain.    Apply clean bandages as advised.    Take your temperature once a day for a week.    Wash your hands often to prevent spreading the infection.  In the future, wash your hands before and after you touch cuts, scratches, or bandages. This will help prevent infection.   When to call your healthcare provider  Call your healthcare provider right away if you have any of the following:    Trouble or pain when moving the joints above or below the infected area    Discharge or pus draining from the area    Fever of 100.4 F (38 C) or higher, or as directed by your healthcare provider    Pain that gets worse in or around the infected     Redness that gets worse in or around the infected area, particularly if the area of redness expands to a wider area    Shaking chills    Swelling of the  infected area    Vomiting  [place] last reviewed this educational content on 11/1/2019 2000-2021 [employer]. All rights reserved. This information is not intended as a substitute for professional medical care. Always follow your healthcare professional's instructions.    Take antibiotics as ordered. Take with food.   Eat a yogurt or take a probiotic daily while taking antibiotics.   Use a heating pad to left thigh for 20 minutes 4 times a day. Protect skin.   Watch marked area of redness. If continues to spread, follow up.   Follow up if not improving.

## 2021-06-11 ENCOUNTER — TELEPHONE (OUTPATIENT)
Dept: FAMILY MEDICINE | Facility: OTHER | Age: 46
End: 2021-06-11

## 2021-06-11 ASSESSMENT — ANXIETY QUESTIONNAIRES: GAD7 TOTAL SCORE: 12

## 2021-06-11 NOTE — TELEPHONE ENCOUNTER
To: Nurse to Ana Latham  Patient has seen Ana for her leg pain.  She would like to speak with nurse.  Please call her back at [phone].  Thank you

## 2021-06-11 NOTE — TELEPHONE ENCOUNTER
WBC and ultrasound were good. Have her elevate and use heat to area. If symptoms are worsening with ABX use, I would like to go go to ED/UC for evaluation. Please advise.

## 2021-06-11 NOTE — TELEPHONE ENCOUNTER
Called and spoke with patient. She said she is having increased leg pain , burning , and starting to hurt on the back of her leg. Wants to know if she should be seen>    SUKHDEV Canchola

## 2021-06-12 DIAGNOSIS — F34.1 DYSTHYMIA: ICD-10-CM

## 2021-06-12 DIAGNOSIS — Z87.898 HISTORY OF SEIZURES: ICD-10-CM

## 2021-06-14 RX ORDER — MULTIVITAMIN/IRON/FOLIC ACID 18MG-0.4MG
TABLET ORAL
Qty: 28 TABLET | Refills: 0 | Status: SHIPPED | OUTPATIENT
Start: 2021-06-14 | End: 2021-07-12

## 2021-06-14 RX ORDER — LEVETIRACETAM 1000 MG/1
TABLET ORAL
Qty: 56 TABLET | Refills: 0 | Status: SHIPPED | OUTPATIENT
Start: 2021-06-14 | End: 2021-07-13

## 2021-06-14 RX ORDER — CLONIDINE HYDROCHLORIDE 0.1 MG/1
TABLET ORAL
Qty: 56 TABLET | Refills: 0 | Status: SHIPPED | OUTPATIENT
Start: 2021-06-14 | End: 2021-07-13

## 2021-06-14 RX ORDER — LAMOTRIGINE 150 MG/1
TABLET ORAL
Qty: 28 TABLET | Refills: 0 | Status: SHIPPED | OUTPATIENT
Start: 2021-06-14 | End: 2021-07-13

## 2021-06-14 RX ORDER — TOPIRAMATE 50 MG/1
TABLET, FILM COATED ORAL
Qty: 56 TABLET | Refills: 0 | Status: SHIPPED | OUTPATIENT
Start: 2021-06-14 | End: 2021-07-13

## 2021-06-14 NOTE — TELEPHONE ENCOUNTER
topamax      Last Written Prescription Date:  5/19/21  Last Fill Quantity: 56,   # refills: 0  Last Office Visit: 6/10/21  Future Office visit:         levetiracetam       Last Written Prescription Date:  5/19/21  Last Fill Quantity: ,56   # refills: 0  Last Office Visit: 6/10/21  Future Office visit:         Lamotrigine       Last Written Prescription Date:  5/19/21  Last Fill Quantity: 28,   # refills: 0  Last Office Visit: 6/10/21  Future Office visit:         Clonidine       Last Written Prescription Date:  5/19/21  Last Fill Quantity: 56,   # refills: 0  Last Office Visit: 6/10/21  Future Office visit:

## 2021-07-12 DIAGNOSIS — F34.1 DYSTHYMIA: ICD-10-CM

## 2021-07-12 NOTE — TELEPHONE ENCOUNTER
multivitamins       Last Written Prescription Date:  6-14/21  Last Fill Quantity: 28,   # refills: 0  Last Office Visit: 6/10/21  Future Office visit:       Routing refill request to provider for review/approval because:

## 2021-07-13 RX ORDER — MULTIVITAMIN/IRON/FOLIC ACID 18MG-0.4MG
TABLET ORAL
Qty: 90 TABLET | Refills: 3 | Status: SHIPPED | OUTPATIENT
Start: 2021-07-13 | End: 2022-02-15

## 2021-07-15 DIAGNOSIS — F34.1 DYSTHYMIA: Primary | ICD-10-CM

## 2021-07-15 RX ORDER — LEVOMILNACIPRAN HYDROCHLORIDE 80 MG/1
CAPSULE, EXTENDED RELEASE ORAL
Qty: 28 CAPSULE | Refills: 0 | Status: SHIPPED | OUTPATIENT
Start: 2021-07-15 | End: 2021-08-10

## 2021-07-15 NOTE — TELEPHONE ENCOUNTER
FETZIMA 80 MG 24 hr capsule  Last Written Prescription Date:  historical  Last Fill Quantity: -,  # refills: -   Last office visit: 6/10/2021   Future Office Visit:   Next 5 appointments (look out 90 days)    Sep 02, 2021  1:15 PM  (Arrive by 1:00 PM)  SHORT with Ana Latham NP  [facility] [place] ([facility] [place] ) [address]  [place] [address]  [phone]           Routing refill request to provider for review/approval because:  Medication is reported/historical    Per pharmacy- IMAN MCDANIEL HAS DENIED TWICE- IS THIS SOMETHING ERIC WILL PRESCRIBE??

## 2021-11-04 DIAGNOSIS — F34.1 DYSTHYMIA: ICD-10-CM

## 2021-11-04 DIAGNOSIS — Z87.898 HISTORY OF SEIZURES: ICD-10-CM

## 2021-11-05 RX ORDER — LEVETIRACETAM 1000 MG/1
TABLET ORAL
Qty: 56 TABLET | Refills: 0 | Status: SHIPPED | OUTPATIENT
Start: 2021-11-05 | End: 2021-11-29

## 2021-11-05 RX ORDER — LEVOMILNACIPRAN HYDROCHLORIDE 80 MG/1
CAPSULE, EXTENDED RELEASE ORAL
Qty: 28 CAPSULE | Refills: 0 | Status: SHIPPED | OUTPATIENT
Start: 2021-11-05 | End: 2021-11-29

## 2021-11-05 RX ORDER — TOPIRAMATE 50 MG/1
TABLET, FILM COATED ORAL
Qty: 56 TABLET | Refills: 0 | Status: SHIPPED | OUTPATIENT
Start: 2021-11-05 | End: 2021-11-29

## 2021-11-05 RX ORDER — LAMOTRIGINE 150 MG/1
TABLET ORAL
Qty: 28 TABLET | Refills: 0 | Status: SHIPPED | OUTPATIENT
Start: 2021-11-05 | End: 2021-11-29

## 2021-11-05 RX ORDER — CLONIDINE HYDROCHLORIDE 0.1 MG/1
TABLET ORAL
Qty: 56 TABLET | Refills: 0 | Status: SHIPPED | OUTPATIENT
Start: 2021-11-05 | End: 2021-11-29

## 2021-11-05 NOTE — TELEPHONE ENCOUNTER
CATAPRES      Last Written Prescription Date:  10-5-2021  Last Fill Quantity: 56,   # refills: 0  Last Office Visit: 6-  Future Office visit:           FETZIMA      Last Written Prescription Date:  10-5-2021  Last Fill Quantity: 28,   # refills: 0  Last Office Visit: 6-  Future Office visit:           LAMICTAL      Last Written Prescription Date:  10-5-2021  Last Fill Quantity: 28,   # refills: 0  Last Office Visit: 6-  Future Office visit:           KEPPRA      Last Written Prescription Date:  10-5-2021  Last Fill Quantity: 56,   # refills: 0  Last Office Visit: 6-  Future Office visit:       :    TOPAMAX      Last Written Prescription Date:  10-5-2021  Last Fill Quantity: 56,   # refills: 0  Last Office Visit: 6-  Future Office visit:       Routing refill request to provider for review/approval because:  Drug not on the [facility], [facility] or [facility] refill protocol or controlled substance

## 2021-11-09 NOTE — TELEPHONE ENCOUNTER
She did establish with me, but doesn't want to come to [place] to see me. She said she would establish with someone else.

## 2021-11-29 DIAGNOSIS — Z87.898 HISTORY OF SEIZURES: ICD-10-CM

## 2021-11-29 DIAGNOSIS — F34.1 DYSTHYMIA: ICD-10-CM

## 2021-11-29 RX ORDER — LEVOMILNACIPRAN HYDROCHLORIDE 80 MG/1
CAPSULE, EXTENDED RELEASE ORAL
Qty: 28 CAPSULE | Refills: 0 | Status: SHIPPED | OUTPATIENT
Start: 2021-11-29 | End: 2021-12-27

## 2021-11-29 RX ORDER — CLONIDINE HYDROCHLORIDE 0.1 MG/1
TABLET ORAL
Qty: 56 TABLET | Refills: 0 | Status: SHIPPED | OUTPATIENT
Start: 2021-11-29 | End: 2021-12-27

## 2021-11-29 RX ORDER — LAMOTRIGINE 150 MG/1
TABLET ORAL
Qty: 28 TABLET | Refills: 0 | Status: SHIPPED | OUTPATIENT
Start: 2021-11-29 | End: 2021-12-27

## 2021-11-29 RX ORDER — LEVETIRACETAM 1000 MG/1
TABLET ORAL
Qty: 56 TABLET | Refills: 0 | Status: SHIPPED | OUTPATIENT
Start: 2021-11-29 | End: 2021-12-27

## 2021-11-29 RX ORDER — TOPIRAMATE 50 MG/1
TABLET, FILM COATED ORAL
Qty: 56 TABLET | Refills: 0 | Status: SHIPPED | OUTPATIENT
Start: 2021-11-29 | End: 2021-12-27

## 2021-11-29 NOTE — TELEPHONE ENCOUNTER
FETZIMA 80 MG 24 hr capsule  Last Written Prescription Date:  11/5/21  Last Fill Quantity: 28,  # refills: 0     topiramate (TOPAMAX) 50 MG tablet  Last Written Prescription Date:  11/5/21  Last Fill Quantity: 56,  # refills: 0     levETIRAcetam (KEPPRA) 1000 MG tablet  Last Written Prescription Date:  11/5/21  Last Fill Quantity: 56,  # refills: 0     lamoTRIgine (LAMICTAL) 150 MG tablet  Last Written Prescription Date:  11/5/21  Last Fill Quantity: 28,  # refills: 0     cloNIDine (CATAPRES) 0.1 MG tablet  Last Written Prescription Date:  11/5/21  Last Fill Quantity: 56,  # refills: 0     Last office visit: 6/10/2021   Future Office Visit:      Routing refill request to provider for review/approval because:  Establishing with Samina Orozco on 1/4/22

## 2021-12-27 DIAGNOSIS — Z87.898 HISTORY OF SEIZURES: ICD-10-CM

## 2021-12-27 DIAGNOSIS — F34.1 DYSTHYMIA: ICD-10-CM

## 2021-12-27 RX ORDER — LEVOMILNACIPRAN HYDROCHLORIDE 80 MG/1
CAPSULE, EXTENDED RELEASE ORAL
Qty: 28 CAPSULE | Refills: 0 | Status: SHIPPED | OUTPATIENT
Start: 2021-12-27 | End: 2022-01-28

## 2021-12-27 RX ORDER — LEVETIRACETAM 1000 MG/1
TABLET ORAL
Qty: 56 TABLET | Refills: 0 | Status: SHIPPED | OUTPATIENT
Start: 2021-12-27 | End: 2022-01-28

## 2021-12-27 RX ORDER — LAMOTRIGINE 150 MG/1
TABLET ORAL
Qty: 28 TABLET | Refills: 0 | Status: SHIPPED | OUTPATIENT
Start: 2021-12-27 | End: 2022-01-28

## 2021-12-27 RX ORDER — TOPIRAMATE 50 MG/1
TABLET, FILM COATED ORAL
Qty: 56 TABLET | Refills: 0 | Status: SHIPPED | OUTPATIENT
Start: 2021-12-27 | End: 2022-01-28

## 2021-12-27 RX ORDER — CLONIDINE HYDROCHLORIDE 0.1 MG/1
TABLET ORAL
Qty: 56 TABLET | Refills: 0 | Status: SHIPPED | OUTPATIENT
Start: 2021-12-27 | End: 2022-01-28

## 2021-12-27 NOTE — TELEPHONE ENCOUNTER
Clonidine  Last Written Prescription Date: 11/29/21  Last Fill Quantity: 56 # of Refills: 0  Last Office Visit: 6/10/21    Lamictal  Last Written Prescription Date: 11/29/21  Last Fill Quantity: 28 # of Refills: 0  Last Office Visit: 6/10/21    Keppra  Last Written Prescription Date: 11/29/21  Last Fill Quantity: 56 # of Refills: 0  Last Office Visit: 6/10/21    Topamax  Last Written Prescription Date: 11/29/21  Last Fill Quantity: 56 # of Refills: 0  Last Office Visit: 6/10/21    Fetzima  Last Written Prescription Date: 11/29/21  Last Fill Quantity: 28 # of Refills: 0  Last Office Visit: 6/10/21

## 2022-01-24 DIAGNOSIS — Z87.898 HISTORY OF SEIZURES: ICD-10-CM

## 2022-01-24 DIAGNOSIS — F34.1 DYSTHYMIA: ICD-10-CM

## 2022-01-25 NOTE — TELEPHONE ENCOUNTER
Topamax      Last Written Prescription Date:  12/27/21  Last Fill Quantity: 56,   # refills: 0  Last Office Visit: 06/10/21  Future Office visit:       Clonidine      Last Written Prescription Date:  12/27/21  Last Fill Quantity: 56,   # refills: 0  Last Office Visit: 06/10/21  Future Office visit:       Fetzima      Last Written Prescription Date:  12/27/21  Last Fill Quantity: 28,   # refills: 0  Last Office Visit: 06/10/21  Future Office visit:         Lamictal      Last Written Prescription Date:  12/27/21  Last Fill Quantity: 28,   # refills: 0  Last Office Visit: 06/10/21  Future Office visit:         Keppra      Last Written Prescription Date:  12/27/21  Last Fill Quantity: 56,   # refills: 0  Last Office Visit: 06/10/21  Future Office visit:

## 2022-01-27 ENCOUNTER — TELEPHONE (OUTPATIENT)
Dept: FAMILY MEDICINE | Facility: OTHER | Age: 47
End: 2022-01-27
Payer: MEDICAID

## 2022-01-27 NOTE — TELEPHONE ENCOUNTER
Left message for patient to contact clinic to verify address and pharmacy. Also to verify that she is aware of her upcoming establish care appointment.

## 2022-01-28 ENCOUNTER — TELEPHONE (OUTPATIENT)
Dept: FAMILY MEDICINE | Facility: OTHER | Age: 47
End: 2022-01-28
Payer: MEDICAID

## 2022-01-28 RX ORDER — CLONIDINE HYDROCHLORIDE 0.1 MG/1
TABLET ORAL
Qty: 56 TABLET | Refills: 0 | Status: SHIPPED | OUTPATIENT
Start: 2022-01-28 | End: 2022-02-25

## 2022-01-28 RX ORDER — LEVOMILNACIPRAN HYDROCHLORIDE 80 MG/1
CAPSULE, EXTENDED RELEASE ORAL
Qty: 28 CAPSULE | Refills: 0 | Status: SHIPPED | OUTPATIENT
Start: 2022-01-28 | End: 2022-02-25

## 2022-01-28 RX ORDER — TOPIRAMATE 50 MG/1
TABLET, FILM COATED ORAL
Qty: 56 TABLET | Refills: 0 | Status: SHIPPED | OUTPATIENT
Start: 2022-01-28 | End: 2022-02-25

## 2022-01-28 RX ORDER — LAMOTRIGINE 150 MG/1
TABLET ORAL
Qty: 28 TABLET | Refills: 0 | Status: SHIPPED | OUTPATIENT
Start: 2022-01-28 | End: 2022-02-25

## 2022-01-28 RX ORDER — LEVETIRACETAM 1000 MG/1
TABLET ORAL
Qty: 56 TABLET | Refills: 0 | Status: SHIPPED | OUTPATIENT
Start: 2022-01-28 | End: 2022-02-25

## 2022-01-28 NOTE — TELEPHONE ENCOUNTER
Received a PA from [employer] for Fetzima 80MG er capsules. Submitted on CMM. Waiting for a response.

## 2022-01-28 NOTE — TELEPHONE ENCOUNTER
We are awaiting patient call back. With it being Friday and requested refills should not be abruptly stopped, so I will refill x 1 pending establish care visit. Last est care was a no-show. If this appointment in February is a no-show, we will need to notify patient via mail of need for ongoing clinic visits.

## 2022-01-31 NOTE — TELEPHONE ENCOUNTER
Received an APPROVAL from [facility] for Fetzima 80mg er capsules. Effective 01/28/2022 to 12/29/2022. Forms scanned to [facility].
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
## 2022-02-15 ENCOUNTER — ANCILLARY PROCEDURE (OUTPATIENT)
Dept: GENERAL RADIOLOGY | Facility: OTHER | Age: 47
End: 2022-02-15
Attending: NURSE PRACTITIONER
Payer: MEDICAID

## 2022-02-15 ENCOUNTER — OFFICE VISIT (OUTPATIENT)
Dept: FAMILY MEDICINE | Facility: OTHER | Age: 47
End: 2022-02-15
Attending: NURSE PRACTITIONER
Payer: MEDICAID

## 2022-02-15 VITALS
DIASTOLIC BLOOD PRESSURE: 84 MMHG | BODY MASS INDEX: 34.55 KG/M2 | RESPIRATION RATE: 16 BRPM | OXYGEN SATURATION: 98 % | HEIGHT: 66 IN | TEMPERATURE: 97.3 F | WEIGHT: 215 LBS | SYSTOLIC BLOOD PRESSURE: 126 MMHG | HEART RATE: 96 BPM

## 2022-02-15 DIAGNOSIS — Z23 NEED FOR VACCINATION: ICD-10-CM

## 2022-02-15 DIAGNOSIS — R73.03 PRE-DIABETES: ICD-10-CM

## 2022-02-15 DIAGNOSIS — Z71.6 ENCOUNTER FOR TOBACCO USE CESSATION COUNSELING: ICD-10-CM

## 2022-02-15 DIAGNOSIS — Z76.89 ENCOUNTER TO ESTABLISH CARE: Primary | ICD-10-CM

## 2022-02-15 DIAGNOSIS — T25.222A PARTIAL THICKNESS BURN OF LEFT FOOT, INITIAL ENCOUNTER: ICD-10-CM

## 2022-02-15 DIAGNOSIS — Z11.59 ENCOUNTER FOR HEPATITIS C SCREENING TEST FOR LOW RISK PATIENT: ICD-10-CM

## 2022-02-15 LAB
ALBUMIN SERPL-MCNC: 3.5 G/DL (ref 3.4–5)
ALP SERPL-CCNC: 73 U/L (ref 40–150)
ALT SERPL W P-5'-P-CCNC: 28 U/L (ref 0–50)
ANION GAP SERPL CALCULATED.3IONS-SCNC: 4 MMOL/L (ref 3–14)
AST SERPL W P-5'-P-CCNC: 26 U/L (ref 0–45)
BASOPHILS # BLD AUTO: 0.1 10E3/UL (ref 0–0.2)
BASOPHILS NFR BLD AUTO: 1 %
BILIRUB SERPL-MCNC: 0.5 MG/DL (ref 0.2–1.3)
BUN SERPL-MCNC: 6 MG/DL (ref 7–30)
CALCIUM SERPL-MCNC: 8.6 MG/DL (ref 8.5–10.1)
CHLORIDE BLD-SCNC: 109 MMOL/L (ref 94–109)
CO2 SERPL-SCNC: 25 MMOL/L (ref 20–32)
CREAT SERPL-MCNC: 0.63 MG/DL (ref 0.52–1.04)
EOSINOPHIL # BLD AUTO: 0.2 10E3/UL (ref 0–0.7)
EOSINOPHIL NFR BLD AUTO: 2 %
ERYTHROCYTE [DISTWIDTH] IN BLOOD BY AUTOMATED COUNT: 16.1 % (ref 10–15)
EST. AVERAGE GLUCOSE BLD GHB EST-MCNC: 114 MG/DL
GFR SERPL CREATININE-BSD FRML MDRD: >90 ML/MIN/1.73M2
GLUCOSE BLD-MCNC: 100 MG/DL (ref 70–99)
HBA1C MFR BLD: 5.6 % (ref 0–5.6)
HCT VFR BLD AUTO: 45.4 % (ref 35–47)
HGB BLD-MCNC: 14.1 G/DL (ref 11.7–15.7)
IMM GRANULOCYTES # BLD: 0 10E3/UL
IMM GRANULOCYTES NFR BLD: 0 %
LYMPHOCYTES # BLD AUTO: 2.5 10E3/UL (ref 0.8–5.3)
LYMPHOCYTES NFR BLD AUTO: 30 %
MCH RBC QN AUTO: 26.3 PG (ref 26.5–33)
MCHC RBC AUTO-ENTMCNC: 31.1 G/DL (ref 31.5–36.5)
MCV RBC AUTO: 85 FL (ref 78–100)
MONOCYTES # BLD AUTO: 0.7 10E3/UL (ref 0–1.3)
MONOCYTES NFR BLD AUTO: 9 %
NEUTROPHILS # BLD AUTO: 4.9 10E3/UL (ref 1.6–8.3)
NEUTROPHILS NFR BLD AUTO: 58 %
NRBC # BLD AUTO: 0 10E3/UL
NRBC BLD AUTO-RTO: 0 /100
PLATELET # BLD AUTO: 293 10E3/UL (ref 150–450)
POTASSIUM BLD-SCNC: 4 MMOL/L (ref 3.4–5.3)
PROT SERPL-MCNC: 7.4 G/DL (ref 6.8–8.8)
RBC # BLD AUTO: 5.37 10E6/UL (ref 3.8–5.2)
SODIUM SERPL-SCNC: 138 MMOL/L (ref 133–144)
WBC # BLD AUTO: 8.3 10E3/UL (ref 4–11)

## 2022-02-15 PROCEDURE — 73630 X-RAY EXAM OF FOOT: CPT | Mod: TC,LT,FY

## 2022-02-15 PROCEDURE — G0463 HOSPITAL OUTPT CLINIC VISIT: HCPCS | Mod: 25 | Performed by: NURSE PRACTITIONER

## 2022-02-15 PROCEDURE — 99214 OFFICE O/P EST MOD 30 MIN: CPT | Performed by: NURSE PRACTITIONER

## 2022-02-15 PROCEDURE — 36415 COLL VENOUS BLD VENIPUNCTURE: CPT | Mod: ZL | Performed by: NURSE PRACTITIONER

## 2022-02-15 PROCEDURE — 86803 HEPATITIS C AB TEST: CPT | Mod: ZL | Performed by: NURSE PRACTITIONER

## 2022-02-15 PROCEDURE — 82310 ASSAY OF CALCIUM: CPT | Mod: ZL | Performed by: NURSE PRACTITIONER

## 2022-02-15 PROCEDURE — G0463 HOSPITAL OUTPT CLINIC VISIT: HCPCS | Performed by: NURSE PRACTITIONER

## 2022-02-15 PROCEDURE — 83036 HEMOGLOBIN GLYCOSYLATED A1C: CPT | Mod: ZL | Performed by: NURSE PRACTITIONER

## 2022-02-15 PROCEDURE — 85025 COMPLETE CBC W/AUTO DIFF WBC: CPT | Mod: ZL | Performed by: NURSE PRACTITIONER

## 2022-02-15 PROCEDURE — 90714 TD VACC NO PRESV 7 YRS+ IM: CPT

## 2022-02-15 ASSESSMENT — ANXIETY QUESTIONNAIRES
GAD7 TOTAL SCORE: 11
7. FEELING AFRAID AS IF SOMETHING AWFUL MIGHT HAPPEN: MORE THAN HALF THE DAYS
6. BECOMING EASILY ANNOYED OR IRRITABLE: SEVERAL DAYS
2. NOT BEING ABLE TO STOP OR CONTROL WORRYING: MORE THAN HALF THE DAYS
3. WORRYING TOO MUCH ABOUT DIFFERENT THINGS: MORE THAN HALF THE DAYS
1. FEELING NERVOUS, ANXIOUS, OR ON EDGE: SEVERAL DAYS
5. BEING SO RESTLESS THAT IT IS HARD TO SIT STILL: SEVERAL DAYS
IF YOU CHECKED OFF ANY PROBLEMS ON THIS QUESTIONNAIRE, HOW DIFFICULT HAVE THESE PROBLEMS MADE IT FOR YOU TO DO YOUR WORK, TAKE CARE OF THINGS AT HOME, OR GET ALONG WITH OTHER PEOPLE: SOMEWHAT DIFFICULT

## 2022-02-15 ASSESSMENT — PATIENT HEALTH QUESTIONNAIRE - PHQ9
5. POOR APPETITE OR OVEREATING: MORE THAN HALF THE DAYS
SUM OF ALL RESPONSES TO PHQ QUESTIONS 1-9: 15

## 2022-02-15 ASSESSMENT — MIFFLIN-ST. JEOR: SCORE: 1641.98

## 2022-02-15 ASSESSMENT — PAIN SCALES - GENERAL: PAINLEVEL: MODERATE PAIN (4)

## 2022-02-15 NOTE — NURSING NOTE
"Chief Complaint   Patient presents with     Establish Care     Burn       Initial /84 (BP Location: Right arm, Patient Position: Sitting, Cuff Size: Adult Large)   Pulse 96   Temp 97.3  F (36.3  C) (Tympanic)   Resp 16   Ht 1.676 m (5' 6\")   Wt 97.5 kg (215 lb)   SpO2 98%   BMI 34.70 kg/m   Estimated body mass index is 34.7 kg/m  as calculated from the following:    Height as of this encounter: 1.676 m (5' 6\").    Weight as of this encounter: 97.5 kg (215 lb).  Medication Reconciliation: complete  Enedina Hsu LPN    "

## 2022-02-16 ASSESSMENT — ANXIETY QUESTIONNAIRES: GAD7 TOTAL SCORE: 11

## 2022-02-17 LAB — HCV AB SERPL QL IA: NONREACTIVE

## 2022-02-23 ENCOUNTER — OFFICE VISIT (OUTPATIENT)
Dept: WOUND CARE | Facility: OTHER | Age: 47
End: 2022-02-23
Attending: NURSE PRACTITIONER
Payer: MEDICAID

## 2022-02-23 VITALS
DIASTOLIC BLOOD PRESSURE: 95 MMHG | HEART RATE: 80 BPM | SYSTOLIC BLOOD PRESSURE: 156 MMHG | TEMPERATURE: 96.9 F | OXYGEN SATURATION: 96 %

## 2022-02-23 DIAGNOSIS — F17.210 CIGARETTE NICOTINE DEPENDENCE WITHOUT COMPLICATION: Primary | ICD-10-CM

## 2022-02-23 DIAGNOSIS — T25.222A PARTIAL THICKNESS BURN OF LEFT FOOT, INITIAL ENCOUNTER: ICD-10-CM

## 2022-02-23 PROCEDURE — G0463 HOSPITAL OUTPT CLINIC VISIT: HCPCS | Mod: 25

## 2022-02-23 PROCEDURE — G0463 HOSPITAL OUTPT CLINIC VISIT: HCPCS

## 2022-02-23 PROCEDURE — 99214 OFFICE O/P EST MOD 30 MIN: CPT | Performed by: CLINICAL NURSE SPECIALIST

## 2022-02-23 ASSESSMENT — PAIN SCALES - GENERAL: PAINLEVEL: MODERATE PAIN (5)

## 2022-02-23 NOTE — PATIENT INSTRUCTIONS
Wound Care Instructions:  1) Wash your hands and work space  2) Gather all your supplies: clean wash cloths, mild soap (baby soap), honey gauze, 4x4 gauze, white tape  3) Cleanse wound with mild soap, rinse, pat dry  4) Dress wound with honey gauze to wound, cover with 4x4 gauze folded in half, secure with white tape.   5) Change dressing every day  6) Dispose of all dirty supplies  7) Wash hands and equipment    Please report any increase in pain, fevers, chills, changes in the drainage odor.   Please call [phone] if you have any questions or concerns or if any problems develop.     Follow up in one week in wound care.

## 2022-02-23 NOTE — NURSING NOTE
"Chief Complaint   Patient presents with     WOUND CARE       Initial BP (!) 156/95 (BP Location: Left arm, Patient Position: Sitting, Cuff Size: Adult Regular)   Pulse 80   Temp 96.9  F (36.1  C) (Tympanic)   SpO2 96%  Estimated body mass index is 34.7 kg/m  as calculated from the following:    Height as of 2/15/22: 1.676 m (5' 6\").    Weight as of 2/15/22: 97.5 kg (215 lb).  Medication Reconciliation: alisha Bee  "
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
## 2022-02-25 DIAGNOSIS — F34.1 DYSTHYMIA: ICD-10-CM

## 2022-02-25 DIAGNOSIS — Z87.898 HISTORY OF SEIZURES: ICD-10-CM

## 2022-02-25 RX ORDER — CLONIDINE HYDROCHLORIDE 0.1 MG/1
TABLET ORAL
Qty: 56 TABLET | Refills: 0 | Status: SHIPPED | OUTPATIENT
Start: 2022-02-25 | End: 2022-03-25

## 2022-02-25 RX ORDER — LEVETIRACETAM 1000 MG/1
TABLET ORAL
Qty: 56 TABLET | Refills: 0 | Status: SHIPPED | OUTPATIENT
Start: 2022-02-25 | End: 2022-03-25

## 2022-02-25 RX ORDER — LEVOMILNACIPRAN HYDROCHLORIDE 80 MG/1
CAPSULE, EXTENDED RELEASE ORAL
Qty: 28 CAPSULE | Refills: 0 | Status: SHIPPED | OUTPATIENT
Start: 2022-02-25 | End: 2022-03-25

## 2022-02-25 RX ORDER — TOPIRAMATE 50 MG/1
TABLET, FILM COATED ORAL
Qty: 56 TABLET | Refills: 0 | Status: SHIPPED | OUTPATIENT
Start: 2022-02-25 | End: 2022-03-24

## 2022-02-25 RX ORDER — LAMOTRIGINE 150 MG/1
TABLET ORAL
Qty: 28 TABLET | Refills: 0 | Status: SHIPPED | OUTPATIENT
Start: 2022-02-25 | End: 2022-03-25

## 2022-02-25 NOTE — TELEPHONE ENCOUNTER
topiramate (TOPAMAX) 50 MG tablet      Last Written Prescription Date:  1-28-22  Last Fill Quantity: 56,   # refills: 0  Last Office Visit: 2-15-22    levETIRAcetam (KEPPRA) 1000 MG tablet      Last Written Prescription Date:  1-28-22  Last Fill Quantity: 56,   # refills: 0    lamoTRIgine (LAMICTAL) 150 MG tablet      Last Written Prescription Date:  1-28-22  Last Fill Quantity: 28,   # refills: 0      cloNIDine (CATAPRES) 0.1 MG tablet      Last Written Prescription Date:  1-28-22  Last Fill Quantity: 56,   # refills: 0    FETZIMA 80 MG 24 hr capsule      Last Written Prescription Date:  1-28-22  Last Fill Quantity: 28,   # refills: 0

## 2022-03-04 NOTE — PROGRESS NOTES
Assessment & Plan     Knee injury, left, initial encounter  RICE reviewed. Knee wrapped by nursing with elastic wrap and patient will consider commercial compression sleeve.  Will proceed to MRI. Depending on results will consider orthopedic referral. Pt agreeable with plan. Continue with ibuprofen. However, strongly advised to limit to max dose of 800 mg every 8 hours. She declines prescription for this.   - XR Knee Left 3 Views (Clinic Performed); Future  - MR Knee Left w/o Contrast; Future      Addendum 3/28/22: MRI results noted (see below). Will place referral to [facility] for meniscus tear.    Personal history of fall  - XR Knee Left 3 Views (Clinic Performed); Future  - MR Knee Left w/o Contrast; Future    Benign essential hypertension  Two  elevated BP on different days. New dx of hypertension. Does report history of blurred vision every now and then and sometimes a headache.  She is on Lamictal and Topamax and so we discussed that typically we start a thiazide and ACE but with her other medications we will start with lisinopril to avoid increasing the Topamax concentration. Follow up with nurse only BP in 2 weeks and 3 month clinic visit with me. If elevated in 2 weeks will increase lisinopril  - lisinopril (ZESTRIL) 5 MG tablet; Take 1 tablet (5 mg) by mouth daily    Do not exceed 800 mg every 8 hours of ibuprofen.   2 week BP nurse only  3 months for HTN follow up with me      Ordering of each unique test  Prescription drug management       Return in about 2 weeks (around 3/29/2022), or if symptoms worsen or fail to improve, for BP Recheck.    Gaby Mitchell, CNP  [facility] [place] JUANCHO Epperson is a 46 year old who presents for the following health issues    HPI     Pain History:  When did you first notice your pain?  About 3 weeks ago she fell on ice landing straight down on her left knee   Have you seen this provider for your pain in the past?   No    Where in your body do your have pain? Left Knee  Are you seeing anyone else for your pain? No  What makes your pain better? Heat Asper cream   What makes your pain worse? Uneven Ground stairs Bending Squating   How has pain affected your ability to work? Does not work   Who lives in your household? Self     How would you describe your pain? Sharp Lateral side and whole knee  Have you had any recent changes to the severity or character of your pain? Worse   Is there an underlying cause that has been identified? Slipped on ice made worse   Has your ability to work or do daily activities changed recently because of your pain? No   Which of these pain treatments have you tried? Heat and Other: Asper Cream   Previous medication treatments:  Other - acetaminophen (Tylenol); ibuprofen 600 mg about every 4 hours.     PHQ-9 SCORE 6/10/2021 2/15/2022 3/15/2022   PHQ-9 Total Score 11 15 8       JOHN-7 SCORE 6/10/2021 2/15/2022 3/15/2022   Total Score 12 11 11     How would you describe your pain? Sharp Lateral side and whole knee  Have you had any recent changes to the severity or character of your pain? Worse   Is there an underlying cause that has been identified? Slipped on ice made worse   Has your ability to work or do daily activities changed recently because of your pain? No   Which of these pain treatments have you tried? Heat and Other: Asper Cream   Previous medication treatments:  Other - acetaminophen (Tylenol)       Hypertension  Two separate elevated BP. New dx of hypertension. Does report history of blurred vision every now and then and sometimes a headache.  She is on Lamictal and Topamax and so we discussed that typically we start a thiazide and ACE but with her other medications we will start with lisinopril to avoid increasing the Topamax concentration.    BP Readings from Last 6 Encounters:   03/15/22 (!) 142/92   02/23/22 (!) 156/95   02/15/22 126/84   06/10/21 118/78   12/08/20 124/82   08/13/20 112/82  "      Review of Systems   Constitutional, HEENT, cardiovascular, pulmonary, gi and gu systems are negative, except as otherwise noted.      Objective    BP (!) 134/90 (BP Location: Right arm, Patient Position: Chair, Cuff Size: Adult Large)   Pulse 86   Temp 98.6  F (37  C) (Tympanic)   Resp 16   Ht 1.676 m (5' 6\")   Wt 97 kg (213 lb 12.8 oz)   LMP 03/15/2022   SpO2 98%   BMI 34.51 kg/m    Body mass index is 34.51 kg/m .       Physical Exam   GENERAL: healthy, alert and no distress  EYES: Eyes grossly normal to inspection, PERRL and conjunctivae and sclerae normal  NECK: no adenopathy, no asymmetry, masses, or scars and thyroid normal to palpation  RESP: lungs clear to auscultation - no rales, rhonchi or wheezes  CV: regular rate and rhythm, normal S1 S2, no S3 or S4, no murmur, click or rub, no peripheral edema and peripheral pulses strong  MS: Left knee: Mild joint effusion with tenderness with patellar movement. No overt joint laxity however, patient guarding with this aspect of exam slightly.   NEURO: Normal strength and tone, mentation intact and speech normal    Results for orders placed or performed in visit on 03/15/22   XR Knee Left 3 Views (Clinic Performed)     Status: None    Narrative    PROCEDURE:  XR KNEE LEFT 3 VIEWS    HISTORY: Knee injury, left, initial encounter    COMPARISON:  None.    TECHNIQUE:  3 views of the left knee were obtained.    FINDINGS:  There are degenerative changes at the patellofemoral  articulation. There is mild joint space narrowing at the medial  femoral tibial articulation. The lateral femoral tibial articulation  is normal in height. Chondrocalcinosis is seen at the knee. The distal  femur proximal tibia and fibula are intact.       Impression    IMPRESSION: Degenerative changes of the knee with chondrocalcinosis.  No acute fractures.      MARÍA ELENA KUMAR MD         SYSTEM ID:  [id number]             "

## 2022-03-15 ENCOUNTER — ANCILLARY PROCEDURE (OUTPATIENT)
Dept: GENERAL RADIOLOGY | Facility: OTHER | Age: 47
End: 2022-03-15
Attending: NURSE PRACTITIONER
Payer: MEDICAID

## 2022-03-15 ENCOUNTER — OFFICE VISIT (OUTPATIENT)
Dept: FAMILY MEDICINE | Facility: OTHER | Age: 47
End: 2022-03-15
Attending: NURSE PRACTITIONER
Payer: MEDICAID

## 2022-03-15 VITALS
OXYGEN SATURATION: 98 % | RESPIRATION RATE: 16 BRPM | BODY MASS INDEX: 34.36 KG/M2 | SYSTOLIC BLOOD PRESSURE: 142 MMHG | DIASTOLIC BLOOD PRESSURE: 92 MMHG | TEMPERATURE: 98.6 F | WEIGHT: 213.8 LBS | HEIGHT: 66 IN | HEART RATE: 86 BPM

## 2022-03-15 DIAGNOSIS — S89.92XA KNEE INJURY, LEFT, INITIAL ENCOUNTER: Primary | ICD-10-CM

## 2022-03-15 DIAGNOSIS — S83.242A TEAR OF MEDIAL MENISCUS OF LEFT KNEE, CURRENT, UNSPECIFIED TEAR TYPE, INITIAL ENCOUNTER: ICD-10-CM

## 2022-03-15 DIAGNOSIS — Z91.81 PERSONAL HISTORY OF FALL: ICD-10-CM

## 2022-03-15 DIAGNOSIS — I10 BENIGN ESSENTIAL HYPERTENSION: ICD-10-CM

## 2022-03-15 DIAGNOSIS — S89.92XA KNEE INJURY, LEFT, INITIAL ENCOUNTER: ICD-10-CM

## 2022-03-15 PROCEDURE — G0463 HOSPITAL OUTPT CLINIC VISIT: HCPCS

## 2022-03-15 PROCEDURE — 99214 OFFICE O/P EST MOD 30 MIN: CPT | Performed by: NURSE PRACTITIONER

## 2022-03-15 PROCEDURE — 73562 X-RAY EXAM OF KNEE 3: CPT | Mod: TC,LT,FY

## 2022-03-15 RX ORDER — LISINOPRIL 5 MG/1
5 TABLET ORAL DAILY
Qty: 90 TABLET | Refills: 0 | Status: SHIPPED | OUTPATIENT
Start: 2022-03-15 | End: 2022-06-09

## 2022-03-15 ASSESSMENT — ANXIETY QUESTIONNAIRES
7. FEELING AFRAID AS IF SOMETHING AWFUL MIGHT HAPPEN: MORE THAN HALF THE DAYS
3. WORRYING TOO MUCH ABOUT DIFFERENT THINGS: MORE THAN HALF THE DAYS
4. TROUBLE RELAXING: MORE THAN HALF THE DAYS
GAD7 TOTAL SCORE: 11
1. FEELING NERVOUS, ANXIOUS, OR ON EDGE: SEVERAL DAYS
IF YOU CHECKED OFF ANY PROBLEMS ON THIS QUESTIONNAIRE, HOW DIFFICULT HAVE THESE PROBLEMS MADE IT FOR YOU TO DO YOUR WORK, TAKE CARE OF THINGS AT HOME, OR GET ALONG WITH OTHER PEOPLE: VERY DIFFICULT
2. NOT BEING ABLE TO STOP OR CONTROL WORRYING: MORE THAN HALF THE DAYS
6. BECOMING EASILY ANNOYED OR IRRITABLE: SEVERAL DAYS
5. BEING SO RESTLESS THAT IT IS HARD TO SIT STILL: SEVERAL DAYS

## 2022-03-15 ASSESSMENT — PAIN SCALES - GENERAL: PAINLEVEL: MODERATE PAIN (5)

## 2022-03-15 ASSESSMENT — PATIENT HEALTH QUESTIONNAIRE - PHQ9: SUM OF ALL RESPONSES TO PHQ QUESTIONS 1-9: 8

## 2022-03-15 NOTE — PATIENT INSTRUCTIONS
Patient Education     Knee Pain  Knee pain is very common. It s especially common in active people who put a lot of pressure on their knees, like runners. It affects women more often than men.  Your kneecap (patella) is a thick, round bone. It covers and protects the front portion of your knee joint. It moves along a groove in your thighbone (femur) as part of the patellofemoral joint. A layer of cartilage surrounds the underside of your kneecap. This layer protects it from grinding against your femur.  When this cartilage softens and breaks down, it can cause knee pain. This is partly because of repetitive stress. The stress irritates the lining of the joint. This causes pain in the underlying bone.  What causes knee pain?  Many things can cause knee pain. You may have more than one cause. Some of these include:    Overuse of the knee joint    The kneecap doesn t line up with the tissue around it    Damage to small nerves in the area    Damage to the ligament-like structure that holds the kneecap in place (retinaculum)    Breakdown of the bone under the cartilage    Swelling in the soft tissues around the kneecap    Injury  You might be more likely to have knee pain if you:    Exercise a lot    Recently increased the intensity of your workouts    Have a body mass index (BMI) greater than 25    Have poor alignment of your kneecap    Walk with your feet turned overly outward or inward    Have weakness in surrounding muscle groups (inner quad or hip adductor muscles)    Have too much tightness in surrounding muscle groups (hamstrings or iliotibial band)    Have a recent history of injury to the area    Are female  Symptoms of knee pain  This type of knee pain is a dull, aching pain in the front of the knee in the area under and around the kneecap. This pain may start quickly or slowly. Your pain might be worse when you squat, run, or sit for a long time. Climbing stairs may be painful or hard to do. You might also  sometimes feel like your knee is giving out. You may have symptoms in one or both of your knees.  Diagnosing knee pain  Your healthcare provider will ask about your medical history and your symptoms. Be sure to describe any activities that make your knee pain worse. He or she will look at your knee. This will include tests of your range of motion, strength, and areas of pain of your knee. Your knee alignment will be checked.  Your healthcare provider will need to rule out other causes of your knee pain, such as arthritis. You may need an imaging test, such as an X-ray or MRI.  Treatment for knee pain  Treatments that can help ease your symptoms may include:    Avoiding activities for a while that make your pain worse, returning to activity over time    Icing the outside of your knee when it causes you pain    Taking over-the-counter pain medicine such as NSAIDs    Wearing a knee brace or taping your knee to support it    Compression to help prevent swelling    Wearing special shoe inserts to help keep your feet in the proper alignment    Elevating your knee    Doing special exercises to stretch and strengthen the muscles around your hip and your knee  These steps help most people manage knee pain. But some cases of knee pain need to be treated with surgery. You rarely need surgery right away. You may need it later if other treatments don t work. Your healthcare provider may refer you to an orthopedic surgeon. He or she will talk with you about your choices.  Preventing knee pain  Losing weight and correcting excess muscle tightness or muscle weakness may help lower your risk.  In some cases, you can prevent knee pain. To help prevent a flare-up of knee pain, do these things:    Regularly do all the exercises your doctor or physical therapist advises    Warm up fully before exercising    Support your knee as advised by your doctor or physical therapist    Increase training gradually, and ease up on training when  needed    Have an expert check your gait for running or other sporting activities    Stretch properly before and after exercise    Replace your running shoes regularly    Lose excess weight  When to call your healthcare provider  Call your healthcare provider right away if:    Your symptoms don t get better after a few weeks of treatment    You have any new symptoms  [place] last reviewed this educational content on 6/1/2019 2000-2021 [employer]. All rights reserved. This information is not intended as a substitute for professional medical care. Always follow your healthcare professional's instructions.         Do not exceed 800 mg every 8 hours of ibuprofen.   2 week BP nurse only  3 months for HTN follow up with me

## 2022-03-16 ASSESSMENT — ANXIETY QUESTIONNAIRES: GAD7 TOTAL SCORE: 11

## 2022-03-18 PROBLEM — I10 BENIGN ESSENTIAL HYPERTENSION: Status: ACTIVE | Noted: 2022-03-18

## 2022-03-18 PROBLEM — Z91.81 PERSONAL HISTORY OF FALL: Status: ACTIVE | Noted: 2022-03-18

## 2022-03-18 PROBLEM — S89.92XA KNEE INJURY, LEFT, INITIAL ENCOUNTER: Status: ACTIVE | Noted: 2022-03-18

## 2022-03-21 ENCOUNTER — TELEPHONE (OUTPATIENT)
Dept: FAMILY MEDICINE | Facility: OTHER | Age: 47
End: 2022-03-21
Payer: MEDICAID

## 2022-03-24 DIAGNOSIS — F34.1 DYSTHYMIA: ICD-10-CM

## 2022-03-24 DIAGNOSIS — Z87.898 HISTORY OF SEIZURES: ICD-10-CM

## 2022-03-24 RX ORDER — TOPIRAMATE 50 MG/1
TABLET, FILM COATED ORAL
Qty: 56 TABLET | Refills: 0 | Status: SHIPPED | OUTPATIENT
Start: 2022-03-24 | End: 2022-03-25

## 2022-03-24 NOTE — TELEPHONE ENCOUNTER
Topamax  Last Written Prescription Date: 2/25/22  Last Fill Quantity: 56 # of Refills: 0  Last Office Visit: 3/15/22

## 2022-03-24 NOTE — TELEPHONE ENCOUNTER
Kongzima  Last Written Prescription Date: 2/25/22  Last Fill Quantity: 28 # of Refills: 0  Last Office Visit: 3/15/22    Clonidine  Last Written Prescription Date: 2/25/22  Last Fill Quantity: 56 # of Refills: 0  Last Office Visit: 3/15/22    Lamictal  Last Written Prescription Date: 2/25/22  Last Fill Quantity: 28 # of Refills: 0  Last Office Visit: 3/15/22    Keppra  Last Written Prescription Date: 2/25/22  Last Fill Quantity: 56 # of Refills: 0  Last Office Visit: 3/15/22

## 2022-03-25 DIAGNOSIS — Z87.898 HISTORY OF SEIZURES: ICD-10-CM

## 2022-03-25 DIAGNOSIS — Z00.00 HEALTHCARE MAINTENANCE: Primary | ICD-10-CM

## 2022-03-25 RX ORDER — TOPIRAMATE 50 MG/1
TABLET, FILM COATED ORAL
Qty: 56 TABLET | Refills: 0 | Status: SHIPPED | OUTPATIENT
Start: 2022-03-25 | End: 2022-05-19

## 2022-03-25 RX ORDER — CLONIDINE HYDROCHLORIDE 0.1 MG/1
TABLET ORAL
Qty: 56 TABLET | Refills: 0 | Status: SHIPPED | OUTPATIENT
Start: 2022-03-25 | End: 2022-04-20

## 2022-03-25 RX ORDER — LEVOMILNACIPRAN HYDROCHLORIDE 80 MG/1
CAPSULE, EXTENDED RELEASE ORAL
Qty: 28 CAPSULE | Refills: 0 | Status: SHIPPED | OUTPATIENT
Start: 2022-03-25 | End: 2022-04-20

## 2022-03-25 RX ORDER — MULTIPLE VITAMINS W/ MINERALS TAB 9MG-400MCG
1 TAB ORAL DAILY
Qty: 90 TABLET | Refills: 3 | Status: SHIPPED | OUTPATIENT
Start: 2022-03-25 | End: 2023-03-21

## 2022-03-25 RX ORDER — LEVETIRACETAM 1000 MG/1
TABLET ORAL
Qty: 56 TABLET | Refills: 0 | Status: SHIPPED | OUTPATIENT
Start: 2022-03-25 | End: 2022-04-20

## 2022-03-25 RX ORDER — LAMOTRIGINE 150 MG/1
TABLET ORAL
Qty: 28 TABLET | Refills: 0 | Status: SHIPPED | OUTPATIENT
Start: 2022-03-25 | End: 2022-04-20

## 2022-03-28 ENCOUNTER — HOSPITAL ENCOUNTER (OUTPATIENT)
Dept: MRI IMAGING | Facility: HOSPITAL | Age: 47
Discharge: HOME | End: 2022-03-28
Attending: NURSE PRACTITIONER | Admitting: NURSE PRACTITIONER
Payer: MEDICAID

## 2022-03-28 DIAGNOSIS — Z91.81 PERSONAL HISTORY OF FALL: ICD-10-CM

## 2022-03-28 DIAGNOSIS — S89.92XA KNEE INJURY, LEFT, INITIAL ENCOUNTER: ICD-10-CM

## 2022-03-28 PROCEDURE — 73721 MRI JNT OF LWR EXTRE W/O DYE: CPT | Mod: LT

## 2022-04-05 ENCOUNTER — TRANSFERRED RECORDS (OUTPATIENT)
Dept: HEALTH INFORMATION MANAGEMENT | Facility: CLINIC | Age: 47
End: 2022-04-05

## 2022-04-06 ENCOUNTER — TELEPHONE (OUTPATIENT)
Dept: FAMILY MEDICINE | Facility: OTHER | Age: 47
End: 2022-04-06

## 2022-04-06 ENCOUNTER — OFFICE VISIT (OUTPATIENT)
Dept: FAMILY MEDICINE | Facility: OTHER | Age: 47
End: 2022-04-06
Attending: NURSE PRACTITIONER
Payer: MEDICAID

## 2022-04-06 VITALS — SYSTOLIC BLOOD PRESSURE: 136 MMHG | OXYGEN SATURATION: 98 % | DIASTOLIC BLOOD PRESSURE: 82 MMHG | HEART RATE: 77 BPM

## 2022-04-06 DIAGNOSIS — I10 BENIGN ESSENTIAL HYPERTENSION: Primary | ICD-10-CM

## 2022-04-06 PROCEDURE — 99207 PR NO CHARGE NURSE ONLY: CPT

## 2022-04-06 NOTE — TELEPHONE ENCOUNTER
Pt just needs a letter saying you agree that she needs an increase in hours due to the list I gave you.

## 2022-04-06 NOTE — TELEPHONE ENCOUNTER
I believe [place] through the county. Please reach out or have someone [place] [facility] [place] see if they need anything on our end.

## 2022-04-18 DIAGNOSIS — Z87.898 HISTORY OF SEIZURES: ICD-10-CM

## 2022-04-18 DIAGNOSIS — F34.1 DYSTHYMIA: ICD-10-CM

## 2022-04-19 ENCOUNTER — TELEPHONE (OUTPATIENT)
Dept: FAMILY MEDICINE | Facility: OTHER | Age: 47
End: 2022-04-19
Payer: MEDICAID

## 2022-04-19 NOTE — TELEPHONE ENCOUNTER
Emergency Department and Urgent Care Follow-up      Reason for ER/UC visit: irregular heart beat and numbness in left arm  o Date seen: 04/15/22      New or Worsening symptoms:  Yes           Questions or concerns?: patient is needing holter monitor place. Unsure where referral was placed.       ER Recommends Follow-up by: follow up with PCP after holter monitor is completed. Advised patient to call [facility] to see if referral for holter monitor is done through the Hospital. Patient and staff/helper verbalized understanding.       RN Recommendations: follow up with PCP  Appointment scheduled: yes.   Next 5 appointments (look out 90 days)    Apr 26, 2022  9:00 AM  (Arrive by 8:45 AM)  SHORT with Gaby Mitchell CNP  [facility] ([facility] ) [address]  [place] [address]  [phone]   May 17, 2022  2:30 PM  (Arrive by 2:15 PM)  SHORT with Gaby Mitchell CNP  [facility] ([facility] ) [address]  [place] [address]  [phone]      o   o     I put in a referral for a holter monitor  You should follow-up with your primary care provider after the holter monitor is completed to discuss results  Try to quit smoking, if unable to quit try to cut back   Return to the ER with any concerns - syncope, chest pain with activity, or any concerns         If you start feeling worse, or have any further questions, please feel free to contact Nurse Triage at [phone].  If needing immediate medical attention at any time please call 911/Go to the ER.

## 2022-04-20 RX ORDER — LAMOTRIGINE 150 MG/1
TABLET ORAL
Qty: 28 TABLET | Refills: 0 | Status: SHIPPED | OUTPATIENT
Start: 2022-04-20 | End: 2022-05-19

## 2022-04-20 RX ORDER — LEVETIRACETAM 1000 MG/1
TABLET ORAL
Qty: 56 TABLET | Refills: 0 | Status: SHIPPED | OUTPATIENT
Start: 2022-04-20 | End: 2022-05-19

## 2022-04-20 RX ORDER — LEVOMILNACIPRAN HYDROCHLORIDE 80 MG/1
CAPSULE, EXTENDED RELEASE ORAL
Qty: 28 CAPSULE | Refills: 0 | Status: SHIPPED | OUTPATIENT
Start: 2022-04-20 | End: 2022-05-19

## 2022-04-20 RX ORDER — CLONIDINE HYDROCHLORIDE 0.1 MG/1
TABLET ORAL
Qty: 56 TABLET | Refills: 0 | Status: SHIPPED | OUTPATIENT
Start: 2022-04-20 | End: 2022-05-19

## 2022-04-20 NOTE — TELEPHONE ENCOUNTER
Catapres      Last Written Prescription Date:  3/25/22  Last Fill Quantity: 56,   # refills: 0  Last Office Visit: 3/15/22  Future Office visit:    Next 5 appointments (look out 90 days)    Apr 26, 2022  9:00 AM  (Arrive by 8:45 AM)  SHORT with Gaby Mitchell CNP  [facility] ([facility] ) [address]  [place] [address]  [phone]   May 17, 2022  2:30 PM  (Arrive by 2:15 PM)  SHORT with Gaby Mitchell CNP  [facility] ([facility] ) [address]  [place] [address]  [phone]           Routing refill request to provider for review/approval because:      Fetzima      Last Written Prescription Date:  3/25/22  Last Fill Quantity: 28,   # refills: 0  Last Office Visit: 3/15/22  Future Office visit:    Next 5 appointments (look out 90 days)    Apr 26, 2022  9:00 AM  (Arrive by 8:45 AM)  SHORT with Gaby Mitchell CNP  [facility] ([facility] ) [address]  [place] [address]  [phone]   May 17, 2022  2:30 PM  (Arrive by 2:15 PM)  SHORT with Gaby Mitchell CNP  [facility] ([facility] ) [address]  [place] [address]  [phone]           Routing refill request to provider for review/approval because:      Lamictal      Last Written Prescription Date:  3/25/22  Last Fill Quantity: 28,   # refills: 0  Last Office Visit: 3/15/22  Future Office visit:    Next 5 appointments (look out 90 days)    Apr 26, 2022  9:00 AM  (Arrive by 8:45 AM)  SHORT with Gaby Mitchell CNP  [facility] ([facility] ) [address]  [place] [address]  [phone]   May 17, 2022  2:30 PM  (Arrive by 2:15 PM)  SHORT with Gaby Mitchell, CNP  [facility] ([facility] ) [address]  [place] [address]  [phone]           Routing refill request to provider for review/approval because:      Keppra      Last Written Prescription Date:  3/25/22  Last Fill Quantity: 56,   # refills: 0  Last Office Visit: 3/15/22  Future Office visit:    Next 5 appointments (look out 90 days)    Apr 26, 2022  9:00 AM  (Arrive by 8:45 AM)  SHORT with Gaby Mitchell CNP  [facility] ([facility] ) [address]  [place] [address]  [phone]   May 17, 2022  2:30 PM  (Arrive by 2:15 PM)  SHORT with Gaby Mitchell CNP  [facility] ([facility] ) [address]  [place] [address]  [phone]           Routing refill request to provider for review/approval because:

## 2022-04-21 ENCOUNTER — TRANSFERRED RECORDS (OUTPATIENT)
Dept: HEALTH INFORMATION MANAGEMENT | Facility: CLINIC | Age: 47
End: 2022-04-21

## 2022-04-21 LAB — PHQ9 SCORE: 24

## 2022-04-25 ENCOUNTER — PATIENT OUTREACH (OUTPATIENT)
Dept: CARE COORDINATION | Facility: OTHER | Age: 47
End: 2022-04-25
Payer: MEDICAID

## 2022-04-25 NOTE — PROGRESS NOTES
Clinic Care Coordination Contact  Care Team Conversations    Chart reviewed by CC. May potentially benefit from Coordination services needing simple resources and navigation through the health system.     Nedra LEON RN,   Care Coordination

## 2022-04-26 ENCOUNTER — PATIENT OUTREACH (OUTPATIENT)
Dept: CARE COORDINATION | Facility: OTHER | Age: 47
End: 2022-04-26

## 2022-04-26 NOTE — PROGRESS NOTES
Clinic Care Coordination Contact  Care Team Conversations    Writer attempted to contact patient regarding missed apt and potential coordination services needed. LVM with requests to call back.   Nedra LEON RN,   . [facility]   [phone]

## 2022-05-11 ENCOUNTER — PATIENT OUTREACH (OUTPATIENT)
Dept: CARE COORDINATION | Facility: OTHER | Age: 47
End: 2022-05-11
Payer: MEDICAID

## 2022-05-11 NOTE — PROGRESS NOTES
Clinic Care Coordination Contact  Care Team Conversations    CC contacted patient and introduced services who seemingly is open to services and follow up by CC. Reminded of apt with PCP on 5/17/22 and dtr called who will transport pt to this apt. Pt states her arthritis has been acting up and is bothering her and would like to address this on next apt. Pt states she does not have any concerns at this time. Per pt she is following with [facility] therapy services as well as a  who will also help set up transport for apt that are out of [facility]. Explains to writer that her mood is OK and appears to be more cheerful as the conversation goes on. CC to follow up and meet in person on next apt date to discuss needs further.    Nedra LEON RN, Care Coordinator  [facility]  [phone] Option 1

## 2022-05-17 DIAGNOSIS — F34.1 DYSTHYMIA: ICD-10-CM

## 2022-05-17 DIAGNOSIS — Z87.898 HISTORY OF SEIZURES: ICD-10-CM

## 2022-05-18 NOTE — PROGRESS NOTES
Assessment & Plan     Dental abscess  Allergies reviewed and severe PCN noted. In light of this, I will place Zeenat back on clindamycin and metronidazole. Daughter reached out during this visit to the dentist. Re-enforced the importance of close dental follow up. They verbalize understanding and are agreement with plan of care.  Return if not improving in 3-4 days and if not resolved within 48 hours of end of treatment of either dental concerns or ear concerns.   - clindamycin (CLEOCIN) 300 MG capsule; Take 1 capsule (300 mg) by mouth 4 times daily for 10 days  - metroNIDAZOLE (FLAGYL) 500 MG tablet; Take 1 tablet (500 mg) by mouth 3 times daily for 5 days  - chlorhexidine (PERIDEX) 0.12 % solution; Take 15 mLs by mouth 2 times daily for 14 days    Otitis media of right ear with rupture of tympanic membrane  In addition with oral antibiotics, I am starting Zeenat on topical otic antibacterial.  - ciprofloxacin (CETRAXAL) 0.2 % otic solution; Place 0.25 mLs Into the left ear 2 times daily for 7 days    Benign essential hypertension  Increase to from 5 to 10 mg lisinopril  Follow up in 2 weeks for BP nurse only recheck and in 3 months from today's visit for treatment re-evalution with me.   - lisinopril (ZESTRIL) 5 MG tablet; Take 2 tablets (10 mg) by mouth daily for 360 days    Prescription drug management  No LOS data to display   Time spent doing chart review, history and exam, documentation and further activities per the note       Return in about 3 months (around 9/9/2022) for BP Recheck, Treatment Follow-up.    Gaby Mitchell, CNP  [facility] [place] [facility]    Subjective   Zeenat is a 46 year old who presents for the following health issues  accompanied by her daughter.    HPI     Hypertension Follow-up      Do you check your blood pressure regularly outside of the clinic? No     Are you following a low salt diet? Yes    Are your blood pressures ever more than 140 on the top number (systolic) OR  "more   than 90 on the bottom number (diastolic), for example 140/90? No      How many servings of fruits and vegetables do you eat daily?  0-1    On average, how many sweetened beverages do you drink each day (Examples: soda, juice, sweet tea, etc.  Do NOT count diet or artificially sweetened beverages)?   0    How many days per week do you exercise enough to make your heart beat faster? 3 or less    How many minutes a day do you exercise enough to make your heart beat faster? 9 or less    How many days per week do you miss taking your medication? 0    Patient reports ongoing tooth pain. She was recently treated with on 5/22/22 with course of clindamycin and metronidazole. She reports that her symptoms had greatly improved. Swelling and redness almost resolved and she was improving.There has been residual bruising to her face from the previous swelling but reports this has been improving as well. Unfortunately, recently the pain is worsening and her ear is left hurting as well. She has had drainage from the left ear as well as drainage from left upper gumline when pressure is applied.  She is planning to schedule with her dentist and her daughter reports that she will be available to get Zeenat to her appointment.    Review of Systems   Constitutional, HEENT, cardiovascular, pulmonary, gi and gu systems are negative, except as otherwise noted.      Objective    BP (!) 154/96 (BP Location: Right arm, Patient Position: Sitting, Cuff Size: Adult Large)   Pulse 104   Temp 98.1  F (36.7  C) (Tympanic)   Resp 16   Ht 1.676 m (5' 6\")   Wt 88.5 kg (195 lb)   SpO2 98%   BMI 31.47 kg/m    Body mass index is 31.47 kg/m .  Physical Exam   GENERAL: alert, mild distress, over weight and appears older than stated age  EYES: Eyes grossly normal to inspection, PERRL and conjunctivae and sclerae normal  HENT: Visible ecchymosis to left maxilla and orbital area. This is non-tender.  right ear: normal: no effusions, no erythema, " normal landmarks, left ear: erythematous, mucopurulent effusion and perforation with mucopurulent drainage, nose without ulcers or lesions. Oral mucous membranes moist. Poor dentition noted with left upper gumline erythremic and edematous. No active drainage from gumline at this time.   NECK: no adenopathy, no asymmetry, masses, or scars and thyroid normal to palpation  RESP: lungs clear to auscultation - no rales, rhonchi or wheezes  CV: regular rate and rhythm, normal S1 S2, no S3 or S4, no murmur, click or rub, no peripheral edema and peripheral pulses strong  NEURO: Normal strength and tone, mentation intact and speech normal  PSYCH: mentation appears normal, affect normal/bright    No results found for any visits on 06/09/22.

## 2022-05-19 RX ORDER — CLONIDINE HYDROCHLORIDE 0.1 MG/1
TABLET ORAL
Qty: 56 TABLET | Refills: 0 | Status: SHIPPED | OUTPATIENT
Start: 2022-05-19 | End: 2022-06-15

## 2022-05-19 RX ORDER — LEVETIRACETAM 1000 MG/1
TABLET ORAL
Qty: 56 TABLET | Refills: 0 | Status: SHIPPED | OUTPATIENT
Start: 2022-05-19 | End: 2022-06-15

## 2022-05-19 RX ORDER — TOPIRAMATE 50 MG/1
TABLET, FILM COATED ORAL
Qty: 56 TABLET | Refills: 0 | Status: SHIPPED | OUTPATIENT
Start: 2022-05-19 | End: 2022-06-15

## 2022-05-19 RX ORDER — LEVOMILNACIPRAN HYDROCHLORIDE 80 MG/1
CAPSULE, EXTENDED RELEASE ORAL
Qty: 28 CAPSULE | Refills: 0 | Status: SHIPPED | OUTPATIENT
Start: 2022-05-19 | End: 2022-06-15

## 2022-05-19 RX ORDER — LAMOTRIGINE 150 MG/1
TABLET ORAL
Qty: 28 TABLET | Refills: 0 | Status: SHIPPED | OUTPATIENT
Start: 2022-05-19 | End: 2022-06-15

## 2022-05-19 NOTE — TELEPHONE ENCOUNTER
lov 03/15/2022      Future Office visit:    Next 5 appointments (look out 90 days)    May 19, 2022  2:30 PM  (Arrive by 2:15 PM)  SHORT with Gaby Mitchell CNP  [facility] ([facility] ) [address]  [place] [address]  [phone]

## 2022-06-08 ENCOUNTER — TELEPHONE (OUTPATIENT)
Dept: CARE COORDINATION | Facility: OTHER | Age: 47
End: 2022-06-08
Payer: MEDICAID

## 2022-06-08 NOTE — TELEPHONE ENCOUNTER
Clinic Care Coordination Contact  Care Team Conversations    CC contacted pts dtr to remind of apt tomorrow afternoon at [facility] location. Dtr confirmed plans of attending.    Nedra LEON RN, Care Coordinator  [facility]  [phone] Option 1

## 2022-06-09 ENCOUNTER — OFFICE VISIT (OUTPATIENT)
Dept: FAMILY MEDICINE | Facility: OTHER | Age: 47
End: 2022-06-09
Attending: NURSE PRACTITIONER
Payer: MEDICAID

## 2022-06-09 ENCOUNTER — PATIENT OUTREACH (OUTPATIENT)
Dept: CARE COORDINATION | Facility: OTHER | Age: 47
End: 2022-06-09
Payer: MEDICAID

## 2022-06-09 VITALS
HEART RATE: 104 BPM | TEMPERATURE: 98.1 F | SYSTOLIC BLOOD PRESSURE: 154 MMHG | WEIGHT: 195 LBS | BODY MASS INDEX: 31.34 KG/M2 | DIASTOLIC BLOOD PRESSURE: 96 MMHG | OXYGEN SATURATION: 98 % | HEIGHT: 66 IN | RESPIRATION RATE: 16 BRPM

## 2022-06-09 DIAGNOSIS — K04.7 DENTAL ABSCESS: Primary | ICD-10-CM

## 2022-06-09 DIAGNOSIS — H72.91 OTITIS MEDIA OF RIGHT EAR WITH RUPTURE OF TYMPANIC MEMBRANE: ICD-10-CM

## 2022-06-09 DIAGNOSIS — I10 BENIGN ESSENTIAL HYPERTENSION: ICD-10-CM

## 2022-06-09 DIAGNOSIS — H66.91 OTITIS MEDIA OF RIGHT EAR WITH RUPTURE OF TYMPANIC MEMBRANE: ICD-10-CM

## 2022-06-09 PROCEDURE — G0463 HOSPITAL OUTPT CLINIC VISIT: HCPCS | Performed by: NURSE PRACTITIONER

## 2022-06-09 PROCEDURE — 99214 OFFICE O/P EST MOD 30 MIN: CPT | Performed by: NURSE PRACTITIONER

## 2022-06-09 RX ORDER — LISINOPRIL 5 MG/1
10 TABLET ORAL DAILY
Qty: 180 TABLET | Refills: 3 | Status: SHIPPED | OUTPATIENT
Start: 2022-06-09 | End: 2022-11-01

## 2022-06-09 RX ORDER — CIPROFLOXACIN 0.5 MG/.25ML
0.25 SOLUTION/ DROPS AURICULAR (OTIC) 2 TIMES DAILY
Qty: 3.5 ML | Refills: 0 | Status: SHIPPED | OUTPATIENT
Start: 2022-06-09 | End: 2022-06-16

## 2022-06-09 RX ORDER — CHLORHEXIDINE GLUCONATE ORAL RINSE 1.2 MG/ML
15 SOLUTION DENTAL 2 TIMES DAILY
Qty: 420 ML | Refills: 0 | Status: SHIPPED | OUTPATIENT
Start: 2022-06-09 | End: 2022-06-23

## 2022-06-09 RX ORDER — METRONIDAZOLE 500 MG/1
500 TABLET ORAL 3 TIMES DAILY
Qty: 15 TABLET | Refills: 0 | Status: SHIPPED | OUTPATIENT
Start: 2022-06-09 | End: 2022-06-14

## 2022-06-09 RX ORDER — CLINDAMYCIN HCL 300 MG
300 CAPSULE ORAL 4 TIMES DAILY
Qty: 40 CAPSULE | Refills: 0 | Status: SHIPPED | OUTPATIENT
Start: 2022-06-09 | End: 2022-06-19

## 2022-06-09 ASSESSMENT — ANXIETY QUESTIONNAIRES
7. FEELING AFRAID AS IF SOMETHING AWFUL MIGHT HAPPEN: SEVERAL DAYS
3. WORRYING TOO MUCH ABOUT DIFFERENT THINGS: MORE THAN HALF THE DAYS
1. FEELING NERVOUS, ANXIOUS, OR ON EDGE: SEVERAL DAYS
IF YOU CHECKED OFF ANY PROBLEMS ON THIS QUESTIONNAIRE, HOW DIFFICULT HAVE THESE PROBLEMS MADE IT FOR YOU TO DO YOUR WORK, TAKE CARE OF THINGS AT HOME, OR GET ALONG WITH OTHER PEOPLE: SOMEWHAT DIFFICULT
6. BECOMING EASILY ANNOYED OR IRRITABLE: SEVERAL DAYS
5. BEING SO RESTLESS THAT IT IS HARD TO SIT STILL: NOT AT ALL
GAD7 TOTAL SCORE: 7
GAD7 TOTAL SCORE: 7
2. NOT BEING ABLE TO STOP OR CONTROL WORRYING: SEVERAL DAYS

## 2022-06-09 ASSESSMENT — PATIENT HEALTH QUESTIONNAIRE - PHQ9
SUM OF ALL RESPONSES TO PHQ QUESTIONS 1-9: 7
5. POOR APPETITE OR OVEREATING: SEVERAL DAYS

## 2022-06-09 ASSESSMENT — PAIN SCALES - GENERAL: PAINLEVEL: SEVERE PAIN (6)

## 2022-06-09 NOTE — NURSING NOTE
"Chief Complaint   Patient presents with     Hypertension     Other     Patient has an infected tooth.        Initial BP (!) 154/96 (BP Location: Right arm, Patient Position: Sitting, Cuff Size: Adult Large)   Pulse 104   Temp 98.1  F (36.7  C) (Tympanic)   Resp 16   Ht 1.676 m (5' 6\")   Wt 88.5 kg (195 lb)   SpO2 98%   BMI 31.47 kg/m   Estimated body mass index is 31.47 kg/m  as calculated from the following:    Height as of this encounter: 1.676 m (5' 6\").    Weight as of this encounter: 88.5 kg (195 lb).  Medication Reconciliation: complete  Enedina Hsu LPN    "

## 2022-06-09 NOTE — PROGRESS NOTES
Clinic Care Coordination Contact  Care Team Conversations    Care Coordination completed face to face prior to apt with PCP. Pt has support from daughter who lives across the street from her as well as [facility]  and counselor. Pts daughter acts as PCA and is paid for this service. PCA hours were recently increased. Pt cont to report missing medications stating [facility]  is trying to get her an alarmed medi-set so she is reminded to take medications. Pts primary concerns today is her tooth which has an abscess and would like additional abx. PCP updated. Noticeable bruising to L. Side of face and reports being able to push on the body of the maxilla where pressure is felt in tooth and substance seeps out. Reported to PCP. States she has [place]. Pt states that dentures or implants are not covered  [facility] one- CC to attempt to reach out to [facility] she is currently registered with (Dick [facility] in [facility]) as well as [facility] and discuss dental coverage of dentures as she is interested after current tooth is fixed.    Nedra LEON RN, Care Coordinator  [facility]  [phone] Option 1

## 2022-06-13 DIAGNOSIS — F34.1 DYSTHYMIA: ICD-10-CM

## 2022-06-13 DIAGNOSIS — Z87.898 HISTORY OF SEIZURES: ICD-10-CM

## 2022-06-14 ENCOUNTER — TELEPHONE (OUTPATIENT)
Dept: CARE COORDINATION | Facility: OTHER | Age: 47
End: 2022-06-14
Payer: MEDICAID

## 2022-06-14 NOTE — TELEPHONE ENCOUNTER
Clinic Care Coordination Contact  Care Team Conversations    CC called pt to f/u on PPC office visit. Pt states that her pain is improving in her ear and tooth. States she is using the mouth wash and drops given and tehre is less puss, pressure and paina ssociated with her tooth. She has been unable to get ahold of the [facility] dental clinic to get earlier apt. CC contact [facility] dental to attempt to get an earlier date and to discuss options related to needed dental care and dentures for this patient after interest was shown on last encounter. No answer LVM with requests to call back with more information on how to achieve this care wanted by pt. CC to wait for returned call and re-attempt at a later date.   Nedra LEON RN, Care Coordinator  [facility]  [phone] Option 1

## 2022-06-15 ENCOUNTER — TELEPHONE (OUTPATIENT)
Dept: CARE COORDINATION | Facility: OTHER | Age: 47
End: 2022-06-15
Payer: MEDICAID

## 2022-06-15 NOTE — TELEPHONE ENCOUNTER
Clinic Care Coordination Contact  Care Team Conversations    CC received phone call from [facility]. CC explained current problems with tooth on L side, office states this patient had missed her last apt along with 5 others that were previously scheduled. CC able to get an apt on 7/26/22 at 8:00 for pt. CC then called dtr with apt information explaining urgency that this apt not be missed. Dtr verbalized understanding and is to drive pt to this apt.     Nedra LEON RN, Care Coordinator  [facility]  [phone] Option 1

## 2022-06-15 NOTE — TELEPHONE ENCOUNTER
Topamax , keppra, lamictal, fetzima, clonidine       Last Written Prescription Date:  5-19-22  Last Fill Quantity: 56,   # refills: 0  Last Office Visit: 6-9-22  Future Office visit:    Next 5 appointments (look out 90 days)    Jun 23, 2022  2:00 PM  (Arrive by 1:45 PM)  Nurse Only with [place] FP NURSE  [facility] ([facility] ) [address]  [place] [address]  [phone]   Sep 12, 2022  3:00 PM  (Arrive by 2:45 PM)  SHORT with Gaby Mitchell, CNP  [facility] ([facility] ) [address]  [place] [address]  [phone]

## 2022-06-16 RX ORDER — TOPIRAMATE 50 MG/1
TABLET, FILM COATED ORAL
Qty: 56 TABLET | Refills: 2 | Status: SHIPPED | OUTPATIENT
Start: 2022-06-16 | End: 2022-09-09

## 2022-06-16 RX ORDER — CLONIDINE HYDROCHLORIDE 0.1 MG/1
TABLET ORAL
Qty: 56 TABLET | Refills: 2 | Status: SHIPPED | OUTPATIENT
Start: 2022-06-16 | End: 2022-09-09

## 2022-06-16 RX ORDER — LEVOMILNACIPRAN HYDROCHLORIDE 80 MG/1
CAPSULE, EXTENDED RELEASE ORAL
Qty: 28 CAPSULE | Refills: 2 | Status: SHIPPED | OUTPATIENT
Start: 2022-06-16 | End: 2022-09-09

## 2022-06-16 RX ORDER — LAMOTRIGINE 150 MG/1
TABLET ORAL
Qty: 28 TABLET | Refills: 2 | Status: SHIPPED | OUTPATIENT
Start: 2022-06-16 | End: 2022-09-09

## 2022-06-16 RX ORDER — LEVETIRACETAM 1000 MG/1
TABLET ORAL
Qty: 56 TABLET | Refills: 2 | Status: SHIPPED | OUTPATIENT
Start: 2022-06-16 | End: 2022-09-09

## 2022-06-23 ENCOUNTER — ALLIED HEALTH/NURSE VISIT (OUTPATIENT)
Dept: FAMILY MEDICINE | Facility: OTHER | Age: 47
End: 2022-06-23
Attending: NURSE PRACTITIONER
Payer: MEDICAID

## 2022-06-23 VITALS — SYSTOLIC BLOOD PRESSURE: 124 MMHG | HEART RATE: 75 BPM | DIASTOLIC BLOOD PRESSURE: 76 MMHG | OXYGEN SATURATION: 98 %

## 2022-06-23 DIAGNOSIS — I10 BENIGN ESSENTIAL HYPERTENSION: Primary | ICD-10-CM

## 2022-06-23 PROCEDURE — 99207 PR NO CHARGE NURSE ONLY: CPT

## 2022-06-23 ASSESSMENT — PAIN SCALES - GENERAL: PAINLEVEL: SEVERE PAIN (6)

## 2022-07-08 ENCOUNTER — TELEPHONE (OUTPATIENT)
Dept: FAMILY MEDICINE | Facility: OTHER | Age: 47
End: 2022-07-08
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
## 2022-07-12 ENCOUNTER — OFFICE VISIT (OUTPATIENT)
Dept: FAMILY MEDICINE | Facility: OTHER | Age: 47
End: 2022-07-12
Attending: NURSE PRACTITIONER
Payer: MEDICAID

## 2022-07-12 VITALS
TEMPERATURE: 97.5 F | DIASTOLIC BLOOD PRESSURE: 82 MMHG | WEIGHT: 194 LBS | SYSTOLIC BLOOD PRESSURE: 126 MMHG | HEART RATE: 72 BPM | RESPIRATION RATE: 16 BRPM | OXYGEN SATURATION: 98 % | BODY MASS INDEX: 31.31 KG/M2

## 2022-07-12 DIAGNOSIS — Z71.6 ENCOUNTER FOR TOBACCO USE CESSATION COUNSELING: ICD-10-CM

## 2022-07-12 DIAGNOSIS — Z48.02 VISIT FOR SUTURE REMOVAL: Primary | ICD-10-CM

## 2022-07-12 PROCEDURE — 99213 OFFICE O/P EST LOW 20 MIN: CPT | Performed by: NURSE PRACTITIONER

## 2022-07-12 PROCEDURE — G0463 HOSPITAL OUTPT CLINIC VISIT: HCPCS | Performed by: NURSE PRACTITIONER

## 2022-07-12 ASSESSMENT — PAIN SCALES - GENERAL: PAINLEVEL: MODERATE PAIN (5)

## 2022-07-12 NOTE — NURSING NOTE
"Chief Complaint   Patient presents with     ER F/U       Initial /82 (BP Location: Right arm, Patient Position: Sitting, Cuff Size: Adult Regular)   Pulse 72   Temp 97.5  F (36.4  C) (Tympanic)   Resp 16   Wt 88 kg (194 lb)   SpO2 98%   BMI 31.31 kg/m   Estimated body mass index is 31.31 kg/m  as calculated from the following:    Height as of 6/9/22: 1.676 m (5' 6\").    Weight as of this encounter: 88 kg (194 lb).  Medication Reconciliation: complete  Enedina Hsu LPN    "
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
## 2022-07-25 ENCOUNTER — PATIENT OUTREACH (OUTPATIENT)
Dept: CARE COORDINATION | Facility: OTHER | Age: 47
End: 2022-07-25

## 2022-07-25 NOTE — PROGRESS NOTES
Clinic Care Coordination Contact  Care Team Conversations    CC was contacted by pts dtr stating confirming ability to bring pt to dental apt tomorrow morning. CC to f/u later in the week regarding apt and current condition.     Nedra LEON RN, Care Coordinator  [facility]  [phone] Option 1

## 2022-08-01 ENCOUNTER — PATIENT OUTREACH (OUTPATIENT)
Dept: CARE COORDINATION | Facility: OTHER | Age: 47
End: 2022-08-01

## 2022-08-01 NOTE — PROGRESS NOTES
Clinic Care Coordination Contact  Care Team Conversations    CC attempted contact with dtr of pt to follow up on dental apt last week and what additional follow up is needed in regards to her dental health. CC also would like to f/u on current pt condition and determine next steps regarding health. No answer LVM with requests to call back. Will wait return call and f/u as needed.    Nedra LEON RN, Care Coordinator  [facility]  [phone] Option 1

## 2022-08-09 NOTE — PROGRESS NOTES
Clinic Care Coordination Contact  Care Team Conversations    CC spoke to pts daughter who states they were able to make the dental apt and her tooth was fixed. CC asked about current condition and whether or not pt has any other health concerns at this time. Pt daughter denied this. CC to follow up with pt at a later date.     Nedra LEON RN, Care Coordinator  [facility]  [phone] Option 1

## 2022-09-06 DIAGNOSIS — F34.1 DYSTHYMIA: ICD-10-CM

## 2022-09-06 DIAGNOSIS — Z87.898 HISTORY OF SEIZURES: ICD-10-CM

## 2022-09-08 NOTE — TELEPHONE ENCOUNTER
Catapres      Last Written Prescription Date:  6/16/22  Last Fill Quantity: 56,   # refills: 2  Last Office Visit: 7/12/22  Future Office visit:    Next 5 appointments (look out 90 days)    Sep 12, 2022  3:00 PM  (Arrive by 2:45 PM)  SHORT with Gaby Mitchell CNP  [facility] ([facility] ) [address]  [place] [address]  [phone]           Routing refill request to provider for review/approval because:      Fetzima      Last Written Prescription Date:  6/16/22  Last Fill Quantity: 28,   # refills: 2  Last Office Visit: 7/12/22  Future Office visit:    Next 5 appointments (look out 90 days)    Sep 12, 2022  3:00 PM  (Arrive by 2:45 PM)  SHORT with Gaby Mitchell CNP  [facility] ([facility] ) [address]  [place] [address]  [phone]           Routing refill request to provider for review/approval because:      Lamictal      Last Written Prescription Date:  6/16/22  Last Fill Quantity: 28,   # refills: 2  Last Office Visit: 7/12/22  Future Office visit:    Next 5 appointments (look out 90 days)    Sep 12, 2022  3:00 PM  (Arrive by 2:45 PM)  SHORT with Gaby Mitchell CNP  [facility] ([facility] ) [address]  [place] [address]  [phone]           Routing refill request to provider for review/approval because:      Keppra      Last Written Prescription Date:  6/16/22  Last Fill Quantity: 56,   # refills: 2  Last Office Visit: 7/12/22  Future Office visit:    Next 5 appointments (look out 90 days)    Sep 12, 2022  3:00 PM  (Arrive by 2:45 PM)  SHORT with Gaby Mitchell CNP  [facility] ([facility] ) [address]  [place] [address]  [phone]           Routing refill request to provider for review/approval because:      Topamax      Last Written  Prescription Date:  6/16/22  Last Fill Quantity: 56,   # refills: 2  Last Office Visit: 7/12/22  Future Office visit:    Next 5 appointments (look out 90 days)    Sep 12, 2022  3:00 PM  (Arrive by 2:45 PM)  SHORT with Gaby Mitchell CNP  [facility] ([facility] ) [address]  [facility] [address]  [phone]           Routing refill request to provider for review/approval because:

## 2022-09-09 RX ORDER — LEVETIRACETAM 1000 MG/1
TABLET ORAL
Qty: 56 TABLET | Refills: 0 | Status: SHIPPED | OUTPATIENT
Start: 2022-09-09 | End: 2022-10-06

## 2022-09-09 RX ORDER — LAMOTRIGINE 150 MG/1
TABLET ORAL
Qty: 28 TABLET | Refills: 0 | Status: SHIPPED | OUTPATIENT
Start: 2022-09-09 | End: 2022-10-06

## 2022-09-09 RX ORDER — LEVOMILNACIPRAN HYDROCHLORIDE 80 MG/1
CAPSULE, EXTENDED RELEASE ORAL
Qty: 28 CAPSULE | Refills: 0 | Status: SHIPPED | OUTPATIENT
Start: 2022-09-09 | End: 2022-10-06

## 2022-09-09 RX ORDER — CLONIDINE HYDROCHLORIDE 0.1 MG/1
TABLET ORAL
Qty: 56 TABLET | Refills: 0 | Status: SHIPPED | OUTPATIENT
Start: 2022-09-09 | End: 2022-10-06

## 2022-09-09 RX ORDER — TOPIRAMATE 50 MG/1
TABLET, FILM COATED ORAL
Qty: 56 TABLET | Refills: 0 | Status: SHIPPED | OUTPATIENT
Start: 2022-09-09 | End: 2022-10-06

## 2022-09-12 ENCOUNTER — PATIENT OUTREACH (OUTPATIENT)
Dept: CARE COORDINATION | Facility: OTHER | Age: 47
End: 2022-09-12

## 2022-09-12 NOTE — PROGRESS NOTES
Clinic Care Coordination Contact  Care Team Conversations    CC contacted pt to follow up on current condition following recent ED visit to [facility] and to remind of apt today with PCP. No answer on pts or daughter phone. LVM on both requesting call back. Will wait returned call or follow up on a later date to determine further care coordination needs.     Nedra LEON RN, Care Coordinator  [facility]  [phone] Option 1

## 2022-10-03 DIAGNOSIS — Z87.898 HISTORY OF SEIZURES: ICD-10-CM

## 2022-10-03 DIAGNOSIS — F34.1 DYSTHYMIA: ICD-10-CM

## 2022-10-06 ENCOUNTER — PATIENT OUTREACH (OUTPATIENT)
Dept: CARE COORDINATION | Facility: OTHER | Age: 47
End: 2022-10-06

## 2022-10-06 RX ORDER — LEVOMILNACIPRAN HYDROCHLORIDE 80 MG/1
CAPSULE, EXTENDED RELEASE ORAL
Qty: 28 CAPSULE | Refills: 0 | Status: SHIPPED | OUTPATIENT
Start: 2022-10-06 | End: 2023-01-06

## 2022-10-06 RX ORDER — TOPIRAMATE 50 MG/1
TABLET, FILM COATED ORAL
Qty: 56 TABLET | Refills: 0 | Status: SHIPPED | OUTPATIENT
Start: 2022-10-06 | End: 2022-11-01

## 2022-10-06 RX ORDER — LEVETIRACETAM 1000 MG/1
TABLET ORAL
Qty: 56 TABLET | Refills: 0 | Status: SHIPPED | OUTPATIENT
Start: 2022-10-06 | End: 2022-12-06

## 2022-10-06 RX ORDER — LAMOTRIGINE 150 MG/1
TABLET ORAL
Qty: 28 TABLET | Refills: 0 | Status: SHIPPED | OUTPATIENT
Start: 2022-10-06 | End: 2022-11-01

## 2022-10-06 RX ORDER — CLONIDINE HYDROCHLORIDE 0.1 MG/1
TABLET ORAL
Qty: 56 TABLET | Refills: 0 | Status: SHIPPED | OUTPATIENT
Start: 2022-10-06 | End: 2022-10-31
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
## 2022-10-07 ENCOUNTER — PATIENT OUTREACH (OUTPATIENT)
Dept: CARE COORDINATION | Facility: OTHER | Age: 47
End: 2022-10-07

## 2022-10-07 NOTE — PROGRESS NOTES
Clinic Care Coordination Contact  Care Team Conversations    CC attempted to contact this pt to follow up unable to contact LVM will look to reach out on a later date.     Nedra LEON RN, Care Coordinator  [facility]  [phone] Option 1

## 2022-10-18 ENCOUNTER — PATIENT OUTREACH (OUTPATIENT)
Dept: CARE COORDINATION | Facility: OTHER | Age: 47
End: 2022-10-18

## 2022-10-18 NOTE — PROGRESS NOTES
Clinic Care Coordination Contact  Care Team Conversations    CC scheduled to complete outreach - on chart review appears pt had a hospital encounter which lead her to being discharged from [facility] to [facility]. CC will follow up on a later date.     Nedra LEON RN, Care Coordinator  [facility]  [phone] Option 1

## 2022-10-28 ENCOUNTER — TRANSFERRED RECORDS (OUTPATIENT)
Dept: HEALTH INFORMATION MANAGEMENT | Facility: CLINIC | Age: 47
End: 2022-10-28

## 2022-10-28 DIAGNOSIS — F34.1 DYSTHYMIA: ICD-10-CM

## 2022-10-28 DIAGNOSIS — Z87.898 HISTORY OF SEIZURES: ICD-10-CM

## 2022-10-31 RX ORDER — CLONIDINE HYDROCHLORIDE 0.1 MG/1
TABLET ORAL
Qty: 56 TABLET | Refills: 1 | Status: SHIPPED | OUTPATIENT
Start: 2022-10-31 | End: 2022-12-22

## 2022-10-31 NOTE — TELEPHONE ENCOUNTER
TOPIRIMATE 50MG TABLET        Last Written Prescription Date:  10/6/22  Last Fill Quantity: 56,   # refills: 0  Last Office Visit: 7/12/22  Future Office visit:    Next 5 appointments (look out 90 days)    Nov 01, 2022 11:30 AM  (Arrive by 11:15 AM)  Office Visit with Gaby Mitchell CNP  [facility] ([facility] ) [address]  [place] [address]  [phone]           Routing refill request to provider for review/approval because:    Anti-Seizure Meds Protocol  Failed 10/28/2022 03:21 PM   Protocol Details  Review Authorizing provider's last note.     Normal CBC on file in past 26 months     CBC RESULTS: Recent Labs   Lab Test 02/15/22  1426   WBC 8.3   RBC 5.37*   HGB 14.1   HCT 45.4   MCV 85   MCH 26.3*   MCHC 31.1*   RDW 16.1*            LAMOTRIGINE 150 MG TABLET        Last Written Prescription Date:  10/6/22  Last Fill Quantity: 28,   # refills: 0  Last Office Visit: 7/12/22  Future Office visit:    Next 5 appointments (look out 90 days)    Nov 01, 2022 11:30 AM  (Arrive by 11:15 AM)  Office Visit with Gaby Mitchell CNP  [facility] ([facility] ) [address]  [place] [address]  [phone]           Routing refill request to provider for review/approval because:  Anti-Seizure Meds Protocol  Failed 10/28/2022 03:21 PM   Protocol Details  Review Authorizing provider's last note.     Normal CBC on file in past 26 months

## 2022-10-31 NOTE — PROGRESS NOTES
Assessment & Plan     Hospital discharge follow-up  Hx of suicide attempt  Denies thoughts of suicide or plan. Has safety plan in place. Support around her self reported as her daughter, who she lives with and her mental health provider as well as her . She states she has phone numbers to call in case of crisis and will present to ER with any suicidal ideation or plan.     Major depressive disorder, recurrent episode, moderate (H)  Release of information signed for [facility]. Has appointment tomorrow with Martha.   - lamoTRIgine (LAMICTAL) 200 MG tablet; Take 1 tablet (200 mg) by mouth daily for 28 days For mood stabilization and depression    Alcohol abuse  Noted. Denies any use since discharge    Chronic pain of both knees  Pain is chronic and unchanged.She is accepting about PT and this will be done at this clinic. I am wondering if she may be a candidate for a weight reducing brace and have asked PT to weigh in on this.   - Physical Therapy Referral; Future    Benign essential hypertension  Discharge summary states 5 mg daily. Original order from me was 10 mg dialy. She is taking 5 mg twice a day and this is working well for her. Therefore, I am not going to change this and have updated our system to twice a day dosing. Refill not sent in to pharmacy since she has ample supply. She will request refill when running out. BP satisfactory this visit.  - lisinopril (ZESTRIL) 5 MG tablet; Take 1 tablet (5 mg) by mouth 2 times daily    Dizziness  Not fully evaluated here as this came up at the end of our visit. Neuro exam reassuring. Chronic. Requests ENT appointment. Symptoms are positional when laying on left side only. Resolve with positioning. Negative sinus tenderness or ear infection. Referral made as requested.   - Adult ENT  Referral; Future      Prescription drug management  No LOS data to display   Time spent doing chart review, history and exam, documentation and further activities per the  "note     BMI:   Estimated body mass index is 31.64 kg/m  as calculated from the following:    Height as of this encounter: 1.676 m (5' 6\").    Weight as of this encounter: 88.9 kg (196 lb).   Weight management plan: Discussed healthy diet and exercise guidelines        Return in about 2 weeks (around 11/15/2022).    Gaby Mitchell CNP  [facility] JUANCHO Epperson is a 47 year old presenting for the following health issues:  Hospital F/U      HPI       Hospital Follow-up Visit:    Hospital/Nursing Home/IP Rehab Facility: [place], [place]  Date of Admission: 10/11/22-10/15/22 discharged from [facility] to [facility]  Date of Discharge: 10/21/22 or 10/22/22  Reason(s) for Admission: behavioral    Was your hospitalization related to COVID-19? No   Problems taking medications regularly:  None  Medication changes since discharge: yes  Problems adhering to non-medication therapy:  None    Summary of hospitalization:  See outside records, reviewed and scanned- Received from [facility] via fax on 10/24/22 via fax and reviewed today during visit.   Diagnostic Tests/Treatments reviewed.  Follow up needed: none  Other Healthcare Providers Involved in Patient s Care:         BRIGITTE Jackson, CNP at [facility]; [facility]   Update since discharge: improved.   Plan of care communicated with patient       Medications reviewed with Zeenat and no changes made other than noted above. Local system updated.    Review of Systems   Constitutional, HEENT, cardiovascular, pulmonary, gi and gu systems are negative, except as otherwise noted.        Objective    /80 (BP Location: Right arm, Patient Position: Sitting, Cuff Size: Adult Regular)   Pulse 92   Temp 97.6  F (36.4  C) (Tympanic)   Resp 16   Ht 1.676 m (5' 6\")   Wt 88.9 kg (196 lb)   SpO2 99%   BMI 31.64 kg/m    Body mass index is 31.64 kg/m .     Physical Exam   GENERAL: healthy, alert and no distress  EYES: Eyes " grossly normal to inspection, PERRL and conjunctivae and sclerae normal  NECK: no adenopathy, no asymmetry, masses, or scars and thyroid normal to palpation  RESP: lungs clear to auscultation - no rales, rhonchi or wheezes  CV: regular rate and rhythm, normal S1 S2, no S3 or S4, no murmur, click or rub, no peripheral edema and peripheral pulses strong  ABDOMEN: soft, nontender, no hepatosplenomegaly, no masses and bowel sounds normal  MS: no gross musculoskeletal defects noted, no edema  SKIN: no suspicious lesions or rashes  NEURO: Normal strength and tone, sensory exam grossly normal, mentation intact, oriented times 4, speech normal and cranial nerves 2-12 intact  PSYCH: mentation appears normal, affect flat, judgement and insight intact and appearance well groomed

## 2022-11-01 ENCOUNTER — OFFICE VISIT (OUTPATIENT)
Dept: FAMILY MEDICINE | Facility: OTHER | Age: 47
End: 2022-11-01
Attending: NURSE PRACTITIONER
Payer: MEDICAID

## 2022-11-01 VITALS
HEIGHT: 66 IN | WEIGHT: 196 LBS | RESPIRATION RATE: 16 BRPM | TEMPERATURE: 97.6 F | BODY MASS INDEX: 31.5 KG/M2 | DIASTOLIC BLOOD PRESSURE: 80 MMHG | SYSTOLIC BLOOD PRESSURE: 130 MMHG | HEART RATE: 92 BPM | OXYGEN SATURATION: 99 %

## 2022-11-01 DIAGNOSIS — I10 BENIGN ESSENTIAL HYPERTENSION: ICD-10-CM

## 2022-11-01 DIAGNOSIS — M25.561 CHRONIC PAIN OF BOTH KNEES: ICD-10-CM

## 2022-11-01 DIAGNOSIS — Z91.51 HX OF SUICIDE ATTEMPT: ICD-10-CM

## 2022-11-01 DIAGNOSIS — F33.1 MAJOR DEPRESSIVE DISORDER, RECURRENT EPISODE, MODERATE (H): ICD-10-CM

## 2022-11-01 DIAGNOSIS — R42 DIZZINESS: ICD-10-CM

## 2022-11-01 DIAGNOSIS — Z09 HOSPITAL DISCHARGE FOLLOW-UP: Primary | ICD-10-CM

## 2022-11-01 DIAGNOSIS — G89.29 CHRONIC PAIN OF BOTH KNEES: ICD-10-CM

## 2022-11-01 DIAGNOSIS — F10.10 ALCOHOL ABUSE: ICD-10-CM

## 2022-11-01 DIAGNOSIS — M25.562 CHRONIC PAIN OF BOTH KNEES: ICD-10-CM

## 2022-11-01 PROBLEM — E87.20 METABOLIC ACIDOSIS: Status: ACTIVE | Noted: 2022-10-11

## 2022-11-01 PROBLEM — F17.200 TOBACCO USE DISORDER: Status: ACTIVE | Noted: 2022-10-15

## 2022-11-01 PROBLEM — T50.901A DRUG OVERDOSE: Status: ACTIVE | Noted: 2022-10-11

## 2022-11-01 PROBLEM — F41.0 PANIC DISORDER WITHOUT AGORAPHOBIA: Status: ACTIVE | Noted: 2022-10-15

## 2022-11-01 PROBLEM — T14.91XA SUICIDE ATTEMPT (H): Status: ACTIVE | Noted: 2022-10-11

## 2022-11-01 PROCEDURE — G0463 HOSPITAL OUTPT CLINIC VISIT: HCPCS | Performed by: NURSE PRACTITIONER

## 2022-11-01 PROCEDURE — 99214 OFFICE O/P EST MOD 30 MIN: CPT | Performed by: NURSE PRACTITIONER

## 2022-11-01 RX ORDER — LAMOTRIGINE 200 MG/1
200 TABLET ORAL DAILY
Qty: 28 TABLET | Refills: 0 | Status: SHIPPED | OUTPATIENT
Start: 2022-11-01 | End: 2023-01-30

## 2022-11-01 RX ORDER — HYDROXYZINE HYDROCHLORIDE 25 MG/1
25 TABLET, FILM COATED ORAL 3 TIMES DAILY PRN
COMMUNITY
End: 2023-06-26

## 2022-11-01 RX ORDER — LAMOTRIGINE 150 MG/1
TABLET ORAL
Qty: 28 TABLET | Refills: 0 | Status: SHIPPED | OUTPATIENT
Start: 2022-11-01 | End: 2022-11-01 | Stop reason: DRUGHIGH

## 2022-11-01 RX ORDER — TOPIRAMATE 50 MG/1
TABLET, FILM COATED ORAL
Qty: 56 TABLET | Refills: 0 | Status: SHIPPED | OUTPATIENT
Start: 2022-11-01 | End: 2022-12-02

## 2022-11-01 RX ORDER — LISINOPRIL 5 MG/1
5 TABLET ORAL 2 TIMES DAILY
Qty: 180 TABLET | Refills: 3
Start: 2022-11-01 | End: 2024-02-27 | Stop reason: DRUGHIGH

## 2022-11-01 ASSESSMENT — ANXIETY QUESTIONNAIRES
5. BEING SO RESTLESS THAT IT IS HARD TO SIT STILL: SEVERAL DAYS
GAD7 TOTAL SCORE: 11
GAD7 TOTAL SCORE: 11
2. NOT BEING ABLE TO STOP OR CONTROL WORRYING: MORE THAN HALF THE DAYS
IF YOU CHECKED OFF ANY PROBLEMS ON THIS QUESTIONNAIRE, HOW DIFFICULT HAVE THESE PROBLEMS MADE IT FOR YOU TO DO YOUR WORK, TAKE CARE OF THINGS AT HOME, OR GET ALONG WITH OTHER PEOPLE: SOMEWHAT DIFFICULT
6. BECOMING EASILY ANNOYED OR IRRITABLE: SEVERAL DAYS
3. WORRYING TOO MUCH ABOUT DIFFERENT THINGS: MORE THAN HALF THE DAYS
1. FEELING NERVOUS, ANXIOUS, OR ON EDGE: MORE THAN HALF THE DAYS
7. FEELING AFRAID AS IF SOMETHING AWFUL MIGHT HAPPEN: SEVERAL DAYS

## 2022-11-01 ASSESSMENT — PATIENT HEALTH QUESTIONNAIRE - PHQ9
SUM OF ALL RESPONSES TO PHQ QUESTIONS 1-9: 11
5. POOR APPETITE OR OVEREATING: MORE THAN HALF THE DAYS

## 2022-11-01 ASSESSMENT — PAIN SCALES - GENERAL: PAINLEVEL: SEVERE PAIN (7)

## 2022-11-01 NOTE — NURSING NOTE
"Chief Complaint   Patient presents with     Hospital F/U       Initial /80 (BP Location: Right arm, Patient Position: Sitting, Cuff Size: Adult Regular)   Pulse 92   Temp 97.6  F (36.4  C) (Tympanic)   Resp 16   Ht 1.676 m (5' 6\")   Wt 88.9 kg (196 lb)   SpO2 99%   BMI 31.64 kg/m   Estimated body mass index is 31.64 kg/m  as calculated from the following:    Height as of this encounter: 1.676 m (5' 6\").    Weight as of this encounter: 88.9 kg (196 lb).  Medication Reconciliation: complete  Enedina Hsu LPN    "

## 2022-11-03 ENCOUNTER — TELEPHONE (OUTPATIENT)
Dept: AUDIOLOGY | Facility: OTHER | Age: 47
End: 2022-11-03

## 2022-11-03 NOTE — TELEPHONE ENCOUNTER
Called patient 3 times and left voicemails.    11/3-letter sent to patient to call and get appt scheduled.    Racquel Garcia

## 2022-11-21 ENCOUNTER — PATIENT OUTREACH (OUTPATIENT)
Dept: CARE COORDINATION | Facility: OTHER | Age: 47
End: 2022-11-21

## 2022-11-21 NOTE — PROGRESS NOTES
"Clinic Care Coordination Contact  Care Team Conversations    CC contacted pt to follow up and determine current condition. Pt states she now has a  nicole mackenzie who arranges transportation as well as other support. CC notified of missed apt on 11/17 - verbalized understanding and would like to make this up rescheduled for tomorrow. Complaints of not being able to lay on left side because things get \"off kilter\" and then when she sits up things are off - unsure if dizziness or vertigo - discussed with PCP who will address tomorrow and assess for any treatments for this. States she is having leg pain her right being worse at this time but that it gives her difficulty at night as she \"feels this in her bones\" and is almost like a cramp but isn't a muscle cramp but a sharp shooting and then aching - this has been noted previously and has a referral for PT would like to do this in [facility] but has not been scheduled for this yet will see when she is able to be scheduled.  She denies having any dental issues at this time. Reports taking her medications as ordered and that they come in a bubble pack through [facility] pharmacy. Denies alcohol use today and states that her mood overall is OK - denies SI or depressive thoughts but that she cannot really tell if her medications have started working at this time. Note CCd to PCP to review prior to visit tomorrow.     Nedra LEON RN, Care Coordinator  [facility]  [phone] Option 1        "

## 2022-11-22 ENCOUNTER — PATIENT OUTREACH (OUTPATIENT)
Dept: CARE COORDINATION | Facility: OTHER | Age: 47
End: 2022-11-22

## 2022-11-22 NOTE — PROGRESS NOTES
Clinic Care Coordination Contact  Care Team Conversations    CC Contacted this pt after visit was rescheduled for Friday. Pt states her ride never showed up. CC asked permission to call her insurance to set up a ride for her. Pt verbalized appreciation. MTM called and states her ride will pick her up anytime within the hour prior to her apt and that they will most likely contact her to confirm the ride prior. CC will plan to complete face to face visit on that date.     Nedra LEON RN, Care Coordinator  [facility]  [phone] Option 1

## 2022-11-25 ENCOUNTER — ANCILLARY PROCEDURE (OUTPATIENT)
Dept: GENERAL RADIOLOGY | Facility: OTHER | Age: 47
End: 2022-11-25
Attending: NURSE PRACTITIONER
Payer: MEDICAID

## 2022-11-25 ENCOUNTER — PATIENT OUTREACH (OUTPATIENT)
Dept: CARE COORDINATION | Facility: OTHER | Age: 47
End: 2022-11-25

## 2022-11-25 ENCOUNTER — OFFICE VISIT (OUTPATIENT)
Dept: FAMILY MEDICINE | Facility: OTHER | Age: 47
End: 2022-11-25
Attending: NURSE PRACTITIONER
Payer: MEDICAID

## 2022-11-25 VITALS
DIASTOLIC BLOOD PRESSURE: 78 MMHG | SYSTOLIC BLOOD PRESSURE: 122 MMHG | RESPIRATION RATE: 16 BRPM | WEIGHT: 200.4 LBS | OXYGEN SATURATION: 99 % | TEMPERATURE: 97.9 F | HEART RATE: 85 BPM | BODY MASS INDEX: 32.35 KG/M2

## 2022-11-25 DIAGNOSIS — M79.604 BILATERAL LEG PAIN: ICD-10-CM

## 2022-11-25 DIAGNOSIS — M79.604 BILATERAL LEG PAIN: Primary | ICD-10-CM

## 2022-11-25 DIAGNOSIS — Z28.21 IMMUNIZATION DECLINED: ICD-10-CM

## 2022-11-25 DIAGNOSIS — M79.605 BILATERAL LEG PAIN: Primary | ICD-10-CM

## 2022-11-25 DIAGNOSIS — Z91.51 HX OF SUICIDE ATTEMPT: ICD-10-CM

## 2022-11-25 DIAGNOSIS — M79.605 BILATERAL LEG PAIN: ICD-10-CM

## 2022-11-25 DIAGNOSIS — R42 DIZZINESS: ICD-10-CM

## 2022-11-25 PROBLEM — E43 SEVERE PROTEIN-CALORIE MALNUTRITION (H): Status: ACTIVE | Noted: 2022-10-14

## 2022-11-25 LAB
ALBUMIN SERPL BCG-MCNC: 3.9 G/DL (ref 3.5–5.2)
ALP SERPL-CCNC: 58 U/L (ref 35–104)
ALT SERPL W P-5'-P-CCNC: 12 U/L (ref 10–35)
ANION GAP SERPL CALCULATED.3IONS-SCNC: 13 MMOL/L (ref 7–15)
AST SERPL W P-5'-P-CCNC: 15 U/L (ref 10–35)
BILIRUB SERPL-MCNC: 0.3 MG/DL
BUN SERPL-MCNC: 11.9 MG/DL (ref 6–20)
CALCIUM SERPL-MCNC: 8.4 MG/DL (ref 8.6–10)
CHLORIDE SERPL-SCNC: 104 MMOL/L (ref 98–107)
CREAT SERPL-MCNC: 0.67 MG/DL (ref 0.51–0.95)
DEPRECATED HCO3 PLAS-SCNC: 20 MMOL/L (ref 22–29)
GFR SERPL CREATININE-BSD FRML MDRD: >90 ML/MIN/1.73M2
GLUCOSE SERPL-MCNC: 102 MG/DL (ref 70–99)
HOLD SPECIMEN: NORMAL
POTASSIUM SERPL-SCNC: 4.7 MMOL/L (ref 3.4–5.3)
PROT SERPL-MCNC: 6.4 G/DL (ref 6.4–8.3)
SODIUM SERPL-SCNC: 137 MMOL/L (ref 136–145)

## 2022-11-25 PROCEDURE — 80053 COMPREHEN METABOLIC PANEL: CPT | Mod: ZL | Performed by: NURSE PRACTITIONER

## 2022-11-25 PROCEDURE — 82040 ASSAY OF SERUM ALBUMIN: CPT | Mod: ZL | Performed by: NURSE PRACTITIONER

## 2022-11-25 PROCEDURE — G0463 HOSPITAL OUTPT CLINIC VISIT: HCPCS

## 2022-11-25 PROCEDURE — 72100 X-RAY EXAM L-S SPINE 2/3 VWS: CPT | Mod: TC,FY

## 2022-11-25 PROCEDURE — 99214 OFFICE O/P EST MOD 30 MIN: CPT | Performed by: NURSE PRACTITIONER

## 2022-11-25 PROCEDURE — 36415 COLL VENOUS BLD VENIPUNCTURE: CPT | Mod: ZL | Performed by: NURSE PRACTITIONER

## 2022-11-25 ASSESSMENT — ANXIETY QUESTIONNAIRES
1. FEELING NERVOUS, ANXIOUS, OR ON EDGE: SEVERAL DAYS
6. BECOMING EASILY ANNOYED OR IRRITABLE: NOT AT ALL
4. TROUBLE RELAXING: NOT AT ALL
3. WORRYING TOO MUCH ABOUT DIFFERENT THINGS: SEVERAL DAYS
2. NOT BEING ABLE TO STOP OR CONTROL WORRYING: SEVERAL DAYS
GAD7 TOTAL SCORE: 4
IF YOU CHECKED OFF ANY PROBLEMS ON THIS QUESTIONNAIRE, HOW DIFFICULT HAVE THESE PROBLEMS MADE IT FOR YOU TO DO YOUR WORK, TAKE CARE OF THINGS AT HOME, OR GET ALONG WITH OTHER PEOPLE: SOMEWHAT DIFFICULT
5. BEING SO RESTLESS THAT IT IS HARD TO SIT STILL: SEVERAL DAYS
7. FEELING AFRAID AS IF SOMETHING AWFUL MIGHT HAPPEN: NOT AT ALL
GAD7 TOTAL SCORE: 4

## 2022-11-25 ASSESSMENT — PATIENT HEALTH QUESTIONNAIRE - PHQ9: SUM OF ALL RESPONSES TO PHQ QUESTIONS 1-9: 7

## 2022-11-25 ASSESSMENT — PAIN SCALES - GENERAL: PAINLEVEL: SEVERE PAIN (6)

## 2022-11-25 NOTE — PROGRESS NOTES
Clinic Care Coordination Contact  Care Team Conversations    CC completed face to face with pt at PCP see CHRISTIE Mitchell CNP's POC. CC worked with PAC staff and scheduled pt for PT on 12/15 at 1:30 Will look to schedule ride for this next week. Pt is now working with [facility] seeing therapy at least 1-2x/week via zoom.  is [facility] [place]. Pt also tells CC that her next dental apt is February 6th - would like to get in sooner if possible. PT has recently moved to a house with dtr in [place].  CC will look into sooner dental apt and follow up wit this pt to notify when ride has been scheduled.     Nedra LEON RN, Care Coordinator  [facility]  [phone] Option 1

## 2022-11-25 NOTE — PROGRESS NOTES
Assessment & Plan     Bilateral leg pain  Being that Zeenat has similar distribution of pain to bilateral knees and hx of lumbar discomfort, I have ordered a lumbar xray. She will follow up with PT apointments as previously ordered.   - XR LUMBAR SPINE 2/3 VIEWS (Clinic Performed); Future    Immunization declined  Declines all. Care Gaps list reviewed. Declines all at screenings at this time.     Dizzines  Hx of suicide attempt  Will get CMP for hx of dizziness and ETOH use.   - Comprehensive metabolic panel (BMP + Alb, Alk Phos, ALT, AST, Total. Bili, TP); Future  - Comprehensive metabolic panel (BMP + Alb, Alk Phos, ALT, AST, Total. Bili, TP)    Ordering of each unique test  I spent a total of 35 minutes on the day of the visit.   Time spent doing chart review, history and exam, documentation and further activities per the note       Return in about 2 months (around 1/25/2023).    Gaby Mitchell, CNP  [facility]    David Epperson is a 47 year old, presenting for the following health issues:  RECHECK      HPI     Patient here for follow up. They were last see on 11/1/22 for a hospital follow up visit. There were a lot of topics covered and follow up was scheduled for 11/17. This appointment was a no -showed due to transportation issues. Thankfully, care coordination has been very helpful in assisting with this. Zeenat's primary concern is bilateral knee pain.     Pain History:  When did you first notice your pain? - Chronic Pain   Have you seen this provider for your pain in the past?   Yes   Where in your body do you have pain? L>R knees.   Are you seeing anyone else for your pain? No    Right Knee: Started years ago. Hx of surgery for meniscus reported. Stable but moderately painful. No loss of function or sensation.     Left Knee: More painful than right. MRI done on 3/28/22 and reviewed with Zeenat. Referral was made on 11/1/22. No visits have taken place due to transportation  issues. Will get these scheduled as Zeenat is interested in moving forward with them. During questioning, Zeenat endorses back pain history with hx of cortison injections to thoracic and upper back. Hx of lumbar pain sara some extent and points to L4-L5 region as intermittently painful. No hx of imaging known or available locally.     Knee pain radiates proximally and distally.    Impression from [facility] 3/28/22 (full report is available in local chart):  1. Medial meniscus tear.  2. Tricompartmental degenerative change.  3. Joint effusion with synovitis.  4. Popliteal cyst.    Mental Health Update: Zeenat reports she has been working with [facility] for both medication management and therapy. Last visit was 1-2 weeks ago. Zeenat unsure on name(s) of provider(s). Per record, seeing BRIGITTE Jackson.  Denies thoughts, plans, or actions toward suicide or self harm.     ETOH- Reports not currently using at all. Did have one episode of drinking a 12-pack of beer since last visit. Reviewed with patient that this can happen and encourage to reach out to support persons for ongoing sobriety. Zeenat reports having support around her and continuing on in sobriety.     Dizziness: Dizziness reported as chronic and intermittent. Usually only occurs when laying on the right side. Resolves with positioning. Reported as not worsening and manageable with position awareness.     HTN: taking lisinopril as ordered. No side effects. No refills needed.       PHQ-9 SCORE 6/9/2022 11/1/2022 11/25/2022   PHQ-9 Total Score 7 11 7       JOHN-7 SCORE 6/9/2022 11/1/2022 11/25/2022   Total Score 7 11 4         Chronic Pain Follow Up: Bilateral knee pain  Analgesia/pain control: not controleld   - Recent changes:  See abvoe.     - Overall control: Tolerable with discomfort    - Current treatments: ibuprofen   Adherence:     - Do you ever take more pain medicine than prescribed? Over the counter    - When did you take your  last dose of pain medicine?  N/A   Adverse effects: No.    PDMP Review       Value Time User    State PDMP site checked  Yes 3/15/2022  1:56 PM Gaby Mitchell CNP        Last CSA Agreement: N/A  CSA -- Patient Level:    CSA: None found at the patient level.       Last UDS: N/A  Nicotine/Tobacco Cessation  Zeenat Blunt presents to discuss nicotine/tobacco cessation.    Tobacco Use     Smoking status: Every Day     Packs/day: 1.00     Years: 30.00     Pack years: 30.00     Types: Cigarettes     Start date: 1988     Smokeless tobacco: Never     Tobacco comments:     has gum   Vaping Use     Vaping Use: Former   Substance and Sexual Activity     Alcohol use: Not Currently     Comment: occastionally      Drug use: Yes     Types: Marijuana     Sexual activity: Not Currently     Partners: Male     Smoking Cessation - Willing To Make Quit Attempt 11/25/2022   If I could quit smoking, I would.  5   I want to quit smoking because I worry about how smoking affects my health.   2   I would be willing to make a plan to quit smoking.  2   I would be willing to cut down my number of cigarettes before quiting.  2   Total Score 11     Prior treatments tried: Nicotine patch  Declines interventions at this time.     Review of Systems   Constitutional, HEENT, cardiovascular, pulmonary, gi and gu systems are negative, except as otherwise noted.      Objective    /78 (BP Location: Right arm, Patient Position: Sitting, Cuff Size: Adult Regular)   Pulse 85   Temp 97.9  F (36.6  C) (Tympanic)   Resp 16   Wt 90.9 kg (200 lb 6.4 oz)   SpO2 99%   BMI 32.35 kg/m    Body mass index is 32.35 kg/m .       Physical Exam   GENERAL: Well appearing, alert, no distress and over weight  EYES: Eyes grossly normal to inspection, PERRL and conjunctivae and sclerae normal  HENT: ear canals and TM's normal, nose and mouth without ulcers or lesions  NECK: no adenopathy, no asymmetry, masses, or scars and thyroid normal to palpation  RESP:  lungs clear to auscultation - no rales, rhonchi or wheezes  CV: regular rate and rhythm, normal S1 S2, no S3 or S4, no murmur, click or rub, no peripheral edema and peripheral pulses strong  MS: KNEES: No visible deformity or skin color changes. Left non-tender. Right: Mild medial tenderness. No joint effusion to either knee. Negative drawer test.  Otherwise no extremities normal- no gross deformities noted  SKIN: no suspicious lesions or rashes. BACK: Mild paraspinal tenderness to L3-L5.  NEURO: Normal strength and tone, mentation intact and speech normal  PSYCH: mentation appears normal, affect normal/bright        Results for orders placed or performed in visit on 11/25/22   XR LUMBAR SPINE 2/3 VIEWS (Clinic Performed)     Status: None    Narrative    Exam: XR LUMBAR SPINE 2/3 VIEWS     History:Female, age 47 years, Bilateral leg pain; Bilateral leg pain    Comparison:  No relevant prior imaging.    Technique: Three views are submitted.    Findings: Bones are normally mineralized. There is no evidence of an  acute or healing fracture. Moderate degenerative changes seen  throughout the lumbar spine. Calcifications in the right paraspinous  regions suggest the possibility of renal or perhaps ureteral calculi.  Postoperative changes seen in the pelvis consistent with tubal  ligation.           Impression    Impression:  No evidence of acute or subacute bony abnormality.     Moderate degenerative changes of the lumbar spine.    Calcifications in the right paraspinous soft tissues suggest the  possibility of renal and/or ureteral calculi.    EPI JACOBO MD         SYSTEM ID:  [id number]   Results for orders placed or performed in visit on 11/25/22   Comprehensive metabolic panel (BMP + Alb, Alk Phos, ALT, AST, Total. Bili, TP)     Status: Abnormal   Result Value Ref Range    Sodium 137 136 - 145 mmol/L    Potassium 4.7 3.4 - 5.3 mmol/L    Chloride 104 98 - 107 mmol/L    Carbon Dioxide (CO2) 20 (L) 22 - 29 mmol/L     Anion Gap 13 7 - 15 mmol/L    Urea Nitrogen 11.9 6.0 - 20.0 mg/dL    Creatinine 0.67 0.51 - 0.95 mg/dL    Calcium 8.4 (L) 8.6 - 10.0 mg/dL    Glucose 102 (H) 70 - 99 mg/dL    Alkaline Phosphatase 58 35 - 104 U/L    AST 15 10 - 35 U/L    ALT 12 10 - 35 U/L    Protein Total 6.4 6.4 - 8.3 g/dL    Albumin 3.9 3.5 - 5.2 g/dL    Bilirubin Total 0.3 <=1.2 mg/dL    GFR Estimate >90 >60 mL/min/1.73m2   Extra Tube     Status: None    Narrative    The following orders were created for panel order Extra Tube.  Procedure                               Abnormality         Status                     ---------                               -----------         ------                     Extra Purple Top Tube[[id number]]                            Final result                 Please view results for these tests on the individual orders.   Extra Purple Top Tube     Status: None   Result Value Ref Range    Hold Specimen [facility]                   169

## 2022-12-02 ENCOUNTER — PATIENT OUTREACH (OUTPATIENT)
Dept: CARE COORDINATION | Facility: OTHER | Age: 47
End: 2022-12-02

## 2022-12-02 NOTE — PROGRESS NOTES
Clinic Care Coordination Contact  Care Team Conversations    CC scheduled ride for 12/15 visit with PT. Reservation ID # [id number], contacted pt to notify and instructed pt to call if she does not receive confirmation with who is providing the ride. CC will reach to ensure ride is verified as well.     Nedra LEON RN, Care Coordinator  [facility]  [phone] Option 1

## 2022-12-15 ENCOUNTER — HOSPITAL ENCOUNTER (OUTPATIENT)
Dept: PHYSICAL THERAPY | Facility: OTHER | Age: 47
Discharge: HOME | End: 2022-12-15
Attending: NURSE PRACTITIONER | Admitting: NURSE PRACTITIONER
Payer: MEDICAID

## 2022-12-15 DIAGNOSIS — M25.562 CHRONIC PAIN OF BOTH KNEES: ICD-10-CM

## 2022-12-15 DIAGNOSIS — G89.29 CHRONIC PAIN OF BOTH KNEES: ICD-10-CM

## 2022-12-15 DIAGNOSIS — M25.561 CHRONIC PAIN OF BOTH KNEES: ICD-10-CM

## 2022-12-15 PROCEDURE — 97162 PT EVAL MOD COMPLEX 30 MIN: CPT | Mod: GP

## 2022-12-15 PROCEDURE — 97110 THERAPEUTIC EXERCISES: CPT | Mod: GP

## 2022-12-15 NOTE — PROGRESS NOTES
12/15/22 1335   General Information   Type of Visit Initial OP Ortho PT Evaluation   Start of Care Date 12/15/22   Referring Physician Gaby Mitchell CNP   Patient/Family Goals Statement Lessen knee pain   Orders Evaluate and Treat   Date of Order 11/01/22   Certification Required? Yes   Medical Diagnosis Chronic B knee pain   Surgical/Medical history reviewed Yes   Precautions/Limitations no known precautions/limitations   General Information Comments Past medical history-see chart for complete listing   Body Part(s)   Body Part(s) Knee   Presentation and Etiology   Pertinent history of current problem (include personal factors and/or comorbidities that impact the POC) Patient reports similar longstanding history of knee pain.  She states she especially noted increased pain after falling on the knees.  She reports having arthroscopic knee surgery on the right knee she believes in proximately 2018 for a right meniscal tear.  She has had some bilateral knee pain in the past however this seems to have exacerbated after her fall especially on the left knee.  She was seen by her primary care provider and has had x-rays and MRIs with positive findings: Medial meniscal tear, tricompartmental mental degenerative change, joint effusion with synovitis, popliteal cyst left knee per MRI.  X-rays impression was as follows: Degenerative changes of the knee with chondrocalcinosis.  Patient was seen for an orthopedic consult with Dr. Paula on 4/05/2022.  She did receive an injection to the left knee that day.  She was also recommended to begin physical therapy.  Patient reports she did not schedule physical therapy following the orthopedic visit.  Due to continued knee pain she is now seeking physical therapy and is referred to PT by her primary care provider.  She reports she has tried all modalities of heat and ice without significant relief.  She has has also taken over-the-counter ibuprofen with minimal relief.    Impairments A. Pain;C. Swelling;D. Decreased ROM;H. Impaired gait;K. Numbness;L. Tingling;M. Locking or catching   Functional Limitations perform activities of daily living;perform desired leisure / sports activities   Symptom Location Left knee: She reports pain generally along the anterior, medial and lateral aspects of the knee.  Right knee: She notes pain primarily along the lateral joint line region   How/Where did it occur From Degenerative Joint Disease;With a fall   Chronicity Chronic   Pain rating (0-10 point scale) Best (/10);Worst (/10)   Best (/10) 5   Worst (/10) 9   Pain quality A. Sharp;C. Aching;E. Shooting   Frequency of pain/symptoms A. Constant   Pain/symptoms are: Worse during the night   Pain/symptoms exacerbated by A. Sitting;B. Walking;D. Carrying;E. Rest;G. Certain positions;I. Bending;M. Other;K. Home tasks   Pain exacerbation comment Squatting, stairs, prolonged standing or sitting   Pain/symptoms eased by A. Sitting;C. Rest;G. Heat;H. Cold;I. OTC medication(s);F. Certain positions   Progression of symptoms since onset: Worsened   Current / Previous Interventions   Diagnostic Tests: X-ray;MRI   X-ray Results Results   X-ray results Degenerative changes of the knee with chondrocalcinosis-left knee   MRI Results Results   MRI results Medial meniscal tear.  Tricompartmental degenerative change, joint effusion with synovitis, popliteal cyst-left knee   Current Level of Function   Patient role/employment history G. Disabled   [facility] [facility]   Fall Risk Screen   Fall screen completed by PT   Have you fallen 2 or more times in the past year? Yes   Have you fallen and had an injury in the past year? No   Is patient a fall risk? No   Fall screen comments falls dut to ice and had ear infection   Abuse Screen (yes response referral indicated)   Feels Unsafe at Home or Work/School no   Feels Threatened by Someone no   Does Anyone Try to Keep You From Having Contact with Others or  Doing Things Outside Your Home? no   Physical Signs of Abuse Present no   Patient needs abuse support services and resources No   Knee Objective Findings   Gait/Locomotion She ambulates with some of an antalgic gait pattern   Knee ROM Comment Left knee = 0 to 135 degrees, right knee within normal limits   Knee/Hip Strength Comments Hip flexion = 4/5 hip extension = 4 -/5, hip ad duction = 4 -/5   Palpation Crepitus is palpable along the left knee with knee flexion extension.  She has tenderness to palpation on left knee along the medial and lateral joint lines, infrapatellar region, the popliteal region.  On the right knee she is noted to have tenderness especially along the lateral joint line   Accessory Motion/Joint Mobility Decreased patellar medial glide and inferior glide on left   Observation No acute distress   Lachmans Test Negative   Anterior Drawer Test Negative   Posterior Drawer Test Negative   Varus Stress Test Negative   Valgus Stress Test Negative for ligamentous laxity however she did note mild pain along the medial joint line   Iva's Test Positive on left   Side (if bilateral, select both right and left) Left   Knee Special Test Comments Positive patellofemoral tests-resisted quad set, patellar grind on left   Left Knee Flexion Strength 5/5 bilaterally   Left Knee Extension Strength 4+/5 on left, 5/5 right   Left Hip Abduction Strength 4+/5 bilaterally   Left Quad Set Strength Poor   L VMO Strength Poor   Left Gastrocnemius Flexibility Hypomobile bilaterally   Left Hamstring Flexibility Hypomobile bilaterally   Left Quadricep Flexibility Hypomobile bilaterally   Planned Therapy Interventions   Planned Therapy Interventions manual therapy;ROM;strengthening;stretching   Planned Modality Interventions   Planned Modality Interventions Comments Modalities as indicated   Clinical Impression   Criteria for Skilled Therapeutic Interventions Met yes, treatment indicated   PT Diagnosis Bilateral knee  pain-especially left   Influenced by the following impairments Pain, decreased mobility, decreased strength   Functional limitations due to impairments Sitting, walking, lifting, carrying, bending, household tasks, squatting, stairs, standing   Clinical Presentation Evolving/Changing   Clinical Presentation Rationale Clinical judgment-progression of pain and function limitations, positive diagnostic testing findings   Clinical Decision Making (Complexity) Moderate complexity   Therapy Frequency 2 times/Week   Predicted Duration of Therapy Intervention (days/wks) Up to 6 weeks   Risk & Benefits of therapy have been explained Yes   Patient, Family & other staff in agreement with plan of care Yes   Clinical Impression Comments Patient presents with bilateral knee pain left more so than right with previous right knee arthroscopic surgery.  She does have positive findings on x-ray and MRI in the left knee.  She is noted to have objective findings significant for decreased mobility, decreased flexibility, decreased strength in the lower extremities   Education Assessment   Barriers to Learning No barriers   ORTHO GOALS   PT Ortho Eval Goals 1;2;3   Ortho Goal 1   Goal Identifier STG 1   Goal Description Decreased pain when at its worst to a 8/10   Target Date 12/29/22   Ortho Goal 2   Goal Identifier LTG 1   Goal Description Patient will demonstrate independence with home exercise program   Target Date 01/26/23   Ortho Goal 3   Goal Identifier LTG 2   Goal Description Patient will be able to walk community distances with little to no difficulty   Target Date 01/26/23   Total Evaluation Time   PT Eval, Moderate Complexity Minutes (32454) 30   Therapy Certification   Certification date from 12/15/22   Certification date to 03/15/23   Medical Diagnosis Chronic bilateral knee pain

## 2022-12-22 DIAGNOSIS — F34.1 DYSTHYMIA: ICD-10-CM

## 2022-12-22 RX ORDER — CLONIDINE HYDROCHLORIDE 0.1 MG/1
TABLET ORAL
Qty: 56 TABLET | Refills: 0 | Status: SHIPPED | OUTPATIENT
Start: 2022-12-22 | End: 2023-01-26

## 2022-12-22 NOTE — TELEPHONE ENCOUNTER
cloNIDine (CATAPRES) 0.1 MG tablet         Last Written Prescription Date:  10-  Last Fill Quantity: 56,   # refills: 1  Last Office Visit: 11-  Future Office visit:       Routing refill request to provider for review/approval because:  Drug not on the [facility], P or [facility] refill protocol or controlled substance

## 2023-01-03 ENCOUNTER — HOSPITAL ENCOUNTER (OUTPATIENT)
Dept: PHYSICAL THERAPY | Facility: OTHER | Age: 48
Discharge: HOME | End: 2023-01-03
Attending: NURSE PRACTITIONER | Admitting: NURSE PRACTITIONER
Payer: MEDICAID

## 2023-01-03 PROCEDURE — 97110 THERAPEUTIC EXERCISES: CPT | Mod: GP

## 2023-01-12 ENCOUNTER — PATIENT OUTREACH (OUTPATIENT)
Dept: CARE COORDINATION | Facility: OTHER | Age: 48
End: 2023-01-12

## 2023-01-12 NOTE — PROGRESS NOTES
Clinic Care Coordination Contact  Care Team Conversations    CC contacted this pt to follow up on current condition and check in on how therapy was going. Pt was slurring words, CC was unable t understand the pt through most of encounter - when asked how PT was going she states that everything is good and she doesn't think she needs surgery from what this CC could make out - pt was unable to understand this CC through most of encounter. CC ended conversation as this was not progressing. CC will re-attempt on a later date. PCP notified.    Nedra LEON RN, Care Coordinator  [facility]  [phone] Option 1

## 2023-01-24 ENCOUNTER — HOSPITAL ENCOUNTER (OUTPATIENT)
Dept: PHYSICAL THERAPY | Facility: OTHER | Age: 48
Discharge: HOME | End: 2023-01-24
Attending: NURSE PRACTITIONER | Admitting: NURSE PRACTITIONER
Payer: MEDICAID

## 2023-01-24 PROCEDURE — 97110 THERAPEUTIC EXERCISES: CPT | Mod: GP

## 2023-01-25 DIAGNOSIS — F34.1 DYSTHYMIA: ICD-10-CM

## 2023-01-25 NOTE — TELEPHONE ENCOUNTER
cloNIDine (CATAPRES) 0.1 MG tablet   Last Written Prescription Date:  12-22-22  Last Fill Quantity: 56,   # refills: 0  Last Office Visit: 11-25-22  Future Office visit:       Routing refill request to provider for review/approval because:  Drug not on the [facility], P or [facility] refill protocol or controlled substance

## 2023-01-26 RX ORDER — CLONIDINE HYDROCHLORIDE 0.1 MG/1
TABLET ORAL
Qty: 56 TABLET | Refills: 0 | Status: SHIPPED | OUTPATIENT
Start: 2023-01-26 | End: 2023-03-01

## 2023-01-30 ENCOUNTER — OFFICE VISIT (OUTPATIENT)
Dept: FAMILY MEDICINE | Facility: OTHER | Age: 48
End: 2023-01-30
Attending: NURSE PRACTITIONER
Payer: MEDICAID

## 2023-01-30 ENCOUNTER — ANCILLARY PROCEDURE (OUTPATIENT)
Dept: GENERAL RADIOLOGY | Facility: OTHER | Age: 48
End: 2023-01-30
Attending: NURSE PRACTITIONER
Payer: MEDICAID

## 2023-01-30 ENCOUNTER — PATIENT OUTREACH (OUTPATIENT)
Dept: CARE COORDINATION | Facility: OTHER | Age: 48
End: 2023-01-30

## 2023-01-30 VITALS
WEIGHT: 197 LBS | TEMPERATURE: 98.2 F | OXYGEN SATURATION: 99 % | RESPIRATION RATE: 12 BRPM | HEIGHT: 66 IN | DIASTOLIC BLOOD PRESSURE: 74 MMHG | SYSTOLIC BLOOD PRESSURE: 126 MMHG | HEART RATE: 76 BPM | BODY MASS INDEX: 31.66 KG/M2

## 2023-01-30 DIAGNOSIS — K04.7 DENTAL INFECTION: Primary | ICD-10-CM

## 2023-01-30 DIAGNOSIS — I10 BENIGN ESSENTIAL HYPERTENSION: ICD-10-CM

## 2023-01-30 DIAGNOSIS — M79.671 RIGHT FOOT PAIN: ICD-10-CM

## 2023-01-30 PROCEDURE — 99214 OFFICE O/P EST MOD 30 MIN: CPT | Performed by: NURSE PRACTITIONER

## 2023-01-30 PROCEDURE — G0463 HOSPITAL OUTPT CLINIC VISIT: HCPCS | Performed by: NURSE PRACTITIONER

## 2023-01-30 PROCEDURE — 73630 X-RAY EXAM OF FOOT: CPT | Mod: TC,RT,FY

## 2023-01-30 RX ORDER — LACTOBACILLUS RHAMNOSUS GG 10B CELL
1 CAPSULE ORAL DAILY
Qty: 30 CAPSULE | Refills: 0 | Status: SHIPPED | OUTPATIENT
Start: 2023-01-30 | End: 2023-06-26

## 2023-01-30 RX ORDER — CLINDAMYCIN HCL 300 MG
300 CAPSULE ORAL 4 TIMES DAILY
Qty: 28 CAPSULE | Refills: 0 | Status: SHIPPED | OUTPATIENT
Start: 2023-01-30 | End: 2023-02-06

## 2023-01-30 ASSESSMENT — PAIN SCALES - GENERAL: PAINLEVEL: MODERATE PAIN (4)

## 2023-01-30 ASSESSMENT — PATIENT HEALTH QUESTIONNAIRE - PHQ9: SUM OF ALL RESPONSES TO PHQ QUESTIONS 1-9: 11

## 2023-01-30 NOTE — PROGRESS NOTES
Clinic Care Coordination Contact  Care Team Conversations    CC completed face to face visit at PCP apt. See Gaby Mitchell's POC. Pt reports tooth abcess and pain - PCP will treat and sees dentist 2/3 on [facility] reservation. Pt states she will call to see if previous reservation in [facility] will be able to cover dentures. CC will follow up and assist with follow through. Reports continuing with therapy case management and MH care with [facility] - PCP has received requests for refil of medication and should be going to mental health provider at [facility] if she is established there ml will follow up to ensure she is following with MH provider who prescribes for her. States she is moving from therapy 1x/week to once every 2 weeks. Recently moved to [place] where she lives with daughter who continues to be her PCA and states this is going really well. CC will continue to follow.     Nedra LEON RN, Care Coordinator  [facility]  [phone] Option 1

## 2023-01-30 NOTE — PROGRESS NOTES
Assessment & Plan     Dental infection  For your sinuses and dental concerns: keep your upcoming appointment and start the clindamycin. Start a probiotic.  - clindamycin (CLEOCIN) 300 MG capsule; Take 1 capsule (300 mg) by mouth 4 times daily for 7 days  - Lactobacillus-Inulin (CULTURELLE DIGESTIVE DAILY) CAPS; Take 1 capsule by mouth daily    Right foot pain  We will see if you can meet with a foot specialist to fit you with orthotics. There is no fracture seen.   Where well fitting shoes that offer support.   - XR FOOT RT G/E 3 VW (Clinic Performed); Future    Benign essential hypertension  At goal. Continue plan of care. Follow up in 3 months and as needed.      Ordering of each unique test  Prescription drug management     Nicotine/Tobacco Cessation:  She reports that she has been smoking cigarettes. She started smoking about 35 years ago. She has a 30.00 pack-year smoking history. She has never used smokeless tobacco.  Nicotine/Tobacco Cessation Plan:   Information offered: Patient not interested at this time        Return if symptoms worsen or fail to improve.    Gaby Mitchell, CNP  [facility]    Subjective   Zeenat is a 47 year old, presenting for the following health issues:  Pain      HPI     Concern - bump on right foot  Onset: month  Description: bump on side of right foot  Intensity: moderate  Progression of Symptoms:  worsening  Accompanying Signs & Symptoms: swelling and pain  Previous history of similar problem: none  Precipitating factors:        Worsened by: walking  Alleviating factors:        Improved by: nothing  Therapies tried and outcome:  none     Concern - Tooth and sinus pain  Zeenat has a chronic history of poor dentition. She reports an upcoming dental appointment 2/3/23 but has moderate-severe pain to the right upper tooth and sinus area. States she pushes on the sinus area next to her nose and can taste pus.    Depression  Confirmed she is being managed through  "[facility] . I will ask Care Coordination to reach out to them to clarify. Ideally, only one provider/system is managing at a time. Refills were requested and already filled this time.     Seizure history  Chronic. Last one was 3 weeks ago. Taking medication as ordered. Keppra and Topamax.      I have reviewed the patient's Past Medical History and Medication List and made updates as needed.       Review of Systems   Constitutional, HEENT, cardiovascular, pulmonary, gi and gu systems are negative, except as otherwise noted.      Objective    /74 (BP Location: Right arm, Patient Position: Sitting, Cuff Size: Adult Regular)   Pulse 76   Temp 98.2  F (36.8  C) (Tympanic)   Resp 12   Ht 1.676 m (5' 6\")   Wt 89.4 kg (197 lb)   SpO2 99%   BMI 31.80 kg/m    Body mass index is 31.8 kg/m .     Physical Exam   GENERAL: healthy, alert and no distress  EYES: Eyes grossly normal to inspection, PERRL and conjunctivae and sclerae normal  HENT: normal cephalic/atraumatic, ear canals and TM's normal, nose and mouth without ulcers or lesions, oral mucous membranes moist. MOUTH: poor dentition. However, no erythema or drainage to gums. There is tenderness to right lower maxilla just right of midline and up to the right of the nose. No intranasal erythema or drainage.   NECK: no adenopathy, no asymmetry, masses, or scars and thyroid normal to palpation  RESP: lungs clear to auscultation - no rales, rhonchi or wheezes  CV: regular rate and rhythm, normal S1 S2, no S3 or S4, no murmur, click or rub, no peripheral edema and peripheral pulses strong  MS: RIGHT FOOT: no visible abnormality or skin discoloration. Mild tenderness to proximal 5th metatarsal which resolves with distraction. No edema or crepitus.   PSYCH: mentation appears normal, affect normal/bright    Results for orders placed or performed in visit on 01/30/23   XR FOOT RT G/E 3 VW (Clinic Performed)     Status: None    Narrative    Exam: XR FOOT RIGHT " G/E 3 VIEWS     History:Female, age 47 years, pain of right proximal 5th metatarsal;  Right foot pain    Comparison:  No relevant prior imaging.    Technique: Three views are submitted.    Findings: Bones are normally mineralized. No evidence of acute or  subacute fracture.  No evidence of dislocation.  No apparent foreign  body within the soft tissues.           Impression    Impression:  No evidence of acute or subacute bony abnormality.    EPI JACOBO MD         SYSTEM ID:  [id number]

## 2023-01-30 NOTE — PATIENT INSTRUCTIONS
We will see if you can meet with a foot specialist to fit you with orthotics. There is no fracture seen.   Where well fitting shoes that offer support.     For your sinuses and dental concerns: keep your upcoming appointment and start the clindamycin. Start a probiotic.

## 2023-02-07 ENCOUNTER — PATIENT OUTREACH (OUTPATIENT)
Dept: CARE COORDINATION | Facility: OTHER | Age: 48
End: 2023-02-07

## 2023-02-07 NOTE — PROGRESS NOTES
Clinic Care Coordination Contact  Care Team Conversations    CC attempted to contact this pt to follow up after dental apt - No answer phone number is unavailable. CC did call [facility] who she still follows - is meeting with her  today and does follow psychiatry Martha Carr who manages her medications. CC will re-attempt on a later date.     Nedra LEON RN, Care Coordinator  [facility]  [phone] Option 1

## 2023-02-09 NOTE — PROGRESS NOTES
CC received a call from [facility] - nurse states that pt does follow psychiatry care Martha Carr and that she is actually due for a follow up because her Lamictal should have ran out. States that she also fills her Fetzima and hydroxyzine. CC attempted to contact pt to notify of follow up needed. No answer - CC will re-attempt on a later date to contact.

## 2023-02-22 DIAGNOSIS — F34.1 DYSTHYMIA: ICD-10-CM

## 2023-02-24 NOTE — TELEPHONE ENCOUNTER
cloNIDine (CATAPRES) 0.1 MG tablet     Last Written Prescription Date:  1/26/2023  Last Fill Quantity: 56,   # refills: 0  Last Office Visit: 1/30/2023  Future Office visit:    Next 5 appointments (look out 90 days)    May 04, 2023  3:00 PM  (Arrive by 2:45 PM)  SHORT with Gaby Mitchell CNP  [facility] [address]  [facility] [address]  [phone]

## 2023-03-01 RX ORDER — CLONIDINE HYDROCHLORIDE 0.1 MG/1
TABLET ORAL
Qty: 56 TABLET | Refills: 1 | Status: SHIPPED | OUTPATIENT
Start: 2023-03-01 | End: 2023-04-18

## 2023-03-14 ENCOUNTER — PATIENT OUTREACH (OUTPATIENT)
Dept: CARE COORDINATION | Facility: OTHER | Age: 48
End: 2023-03-14

## 2023-03-14 NOTE — PROGRESS NOTES
Clinic Care Coordination Contact  Care Team Conversations    CC attempted to contact this pt to follow up and remind of needed apt with [facility] if she is still needing/wanting to follow with this provider, also wanted to follow up on dental apt. Pt answered phone and hung up after CC introduced self. CC will re-attempt on a later date.     Nedra LEON RN, Care Coordinator  [facility]  [phone] Option 1

## 2023-03-21 DIAGNOSIS — Z00.00 HEALTHCARE MAINTENANCE: ICD-10-CM

## 2023-03-21 RX ORDER — MV,CAL,MIN/IRON/FOLIC ACID/LUT 18-500-300
TABLET ORAL
Qty: 28 TABLET | Refills: 0 | Status: SHIPPED | OUTPATIENT
Start: 2023-03-21 | End: 2023-04-18

## 2023-03-21 NOTE — TELEPHONE ENCOUNTER
Refill request for [facility] with iron.    Last filled was [facility] with minerals on 3.25.2022 #90 with 3.    LOV:1.30.2022      Leora Triplett RN

## 2023-04-17 DIAGNOSIS — Z00.00 HEALTHCARE MAINTENANCE: ICD-10-CM

## 2023-04-17 DIAGNOSIS — F34.1 DYSTHYMIA: ICD-10-CM

## 2023-04-17 DIAGNOSIS — F33.1 MAJOR DEPRESSIVE DISORDER, RECURRENT EPISODE, MODERATE (H): ICD-10-CM

## 2023-04-18 RX ORDER — MV,CAL,MIN/IRON/FOLIC ACID/LUT 18-500-300
TABLET ORAL
Qty: 28 TABLET | Refills: 0 | Status: SHIPPED | OUTPATIENT
Start: 2023-04-18 | End: 2023-05-16

## 2023-04-18 RX ORDER — CLONIDINE HYDROCHLORIDE 0.1 MG/1
TABLET ORAL
Qty: 56 TABLET | Refills: 0 | Status: SHIPPED | OUTPATIENT
Start: 2023-04-18 | End: 2023-05-08

## 2023-04-18 RX ORDER — LAMOTRIGINE 200 MG/1
TABLET ORAL
Qty: 28 TABLET | Refills: 0 | Status: SHIPPED | OUTPATIENT
Start: 2023-04-18 | End: 2023-05-08

## 2023-04-18 RX ORDER — LEVOMILNACIPRAN HYDROCHLORIDE 80 MG/1
CAPSULE, EXTENDED RELEASE ORAL
Qty: 28 CAPSULE | Refills: 0 | Status: SHIPPED | OUTPATIENT
Start: 2023-04-18 | End: 2023-05-08

## 2023-04-21 ENCOUNTER — PATIENT OUTREACH (OUTPATIENT)
Dept: CARE COORDINATION | Facility: OTHER | Age: 48
End: 2023-04-21

## 2023-04-21 NOTE — PROGRESS NOTES
Clinic Care Coordination Contact  Care Team Conversations    CC attempted to contact this pt to follow up on current condition and to remind of upcoming apt - no answer LVM with requests to call abck - will plan to complete face to face at apt with PCP.     Nedra LEON RN, Care Coordinator  [facility]  [phone] Option 1

## 2023-05-02 NOTE — PROGRESS NOTES
[facility]    Outpatient Physical Therapy Discharge Note  Patient: Zeenat Blunt  : 1975    Beginning/End Dates of Reporting Period:  12/15/22 to 3/01/23    Referring Provider: Gaby Mitchell NP    Therapy Diagnosis: Chronic B knee pain     Client Self Report: pt hasnt been in PT since 23 as she has cancelled or no showed her appts. SHe notes the tape did seem to help. Overall, no changes in knee pain yet.    Objective Measurements:                                          Outcome Measures (most recent score):      Goals:  Goal Identifier STG 1   Goal Description Decreased pain when at its worst to a 8/10   Target Date 22   Date Met  22   Progress (detail required for progress note):       Goal Identifier LTG 1   Goal Description Patient will demonstrate independence with home exercise program   Target Date 23   Date Met  23   Progress (detail required for progress note):       Goal Identifier LTG 2   Goal Description Patient will be able to walk community distances with little to no difficulty   Target Date 23   Date Met      Progress (detail required for progress note): not assessed at last session     Goal Identifier     Goal Description     Target Date     Date Met      Progress (detail required for progress note):       Goal Identifier     Goal Description     Target Date     Date Met      Progress (detail required for progress note):       Goal Identifier     Goal Description     Target Date     Date Met      Progress (detail required for progress note):       Goal Identifier     Goal Description     Target Date     Date Met      Progress (detail required for progress note):       Goal Identifier     Goal Description     Target Date     Date Met      Progress (detail required for progress note):             Plan:  Discharge from therapy.    Discharge:    Reason  for Discharge: Patient has failed to schedule further appointments.    Equipment Issued: none    Discharge Plan: Patient to continue home program.
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
## 2023-05-08 ENCOUNTER — ANCILLARY PROCEDURE (OUTPATIENT)
Dept: GENERAL RADIOLOGY | Facility: OTHER | Age: 48
End: 2023-05-08
Attending: NURSE PRACTITIONER
Payer: MEDICAID

## 2023-05-08 ENCOUNTER — OFFICE VISIT (OUTPATIENT)
Dept: FAMILY MEDICINE | Facility: OTHER | Age: 48
End: 2023-05-08
Attending: NURSE PRACTITIONER
Payer: MEDICAID

## 2023-05-08 ENCOUNTER — PATIENT OUTREACH (OUTPATIENT)
Dept: CARE COORDINATION | Facility: OTHER | Age: 48
End: 2023-05-08

## 2023-05-08 VITALS
RESPIRATION RATE: 20 BRPM | SYSTOLIC BLOOD PRESSURE: 118 MMHG | BODY MASS INDEX: 32.2 KG/M2 | OXYGEN SATURATION: 98 % | DIASTOLIC BLOOD PRESSURE: 78 MMHG | WEIGHT: 199.5 LBS | TEMPERATURE: 97.2 F | HEART RATE: 87 BPM

## 2023-05-08 DIAGNOSIS — R20.2 PARESTHESIA OF UPPER LIMB: ICD-10-CM

## 2023-05-08 DIAGNOSIS — M54.42 CHRONIC MIDLINE LOW BACK PAIN WITH BILATERAL SCIATICA: ICD-10-CM

## 2023-05-08 DIAGNOSIS — Z12.31 ENCOUNTER FOR SCREENING MAMMOGRAM FOR BREAST CANCER: ICD-10-CM

## 2023-05-08 DIAGNOSIS — M54.2 CHRONIC NECK PAIN: ICD-10-CM

## 2023-05-08 DIAGNOSIS — Z71.6 ENCOUNTER FOR TOBACCO USE CESSATION COUNSELING: ICD-10-CM

## 2023-05-08 DIAGNOSIS — M54.41 CHRONIC MIDLINE LOW BACK PAIN WITH BILATERAL SCIATICA: ICD-10-CM

## 2023-05-08 DIAGNOSIS — F33.1 MAJOR DEPRESSIVE DISORDER, RECURRENT EPISODE, MODERATE (H): ICD-10-CM

## 2023-05-08 DIAGNOSIS — Z87.898 HISTORY OF SEIZURES: ICD-10-CM

## 2023-05-08 DIAGNOSIS — G89.29 CHRONIC MIDLINE LOW BACK PAIN WITH BILATERAL SCIATICA: ICD-10-CM

## 2023-05-08 DIAGNOSIS — Z87.828 HISTORY OF TRAUMA: ICD-10-CM

## 2023-05-08 DIAGNOSIS — E66.811 CLASS 1 OBESITY DUE TO EXCESS CALORIES WITHOUT SERIOUS COMORBIDITY WITH BODY MASS INDEX (BMI) OF 32.0 TO 32.9 IN ADULT: ICD-10-CM

## 2023-05-08 DIAGNOSIS — Z12.11 COLON CANCER SCREENING: ICD-10-CM

## 2023-05-08 DIAGNOSIS — G89.29 CHRONIC NECK PAIN: ICD-10-CM

## 2023-05-08 DIAGNOSIS — Z87.828 HISTORY OF TRAUMA: Primary | ICD-10-CM

## 2023-05-08 DIAGNOSIS — E66.09 CLASS 1 OBESITY DUE TO EXCESS CALORIES WITHOUT SERIOUS COMORBIDITY WITH BODY MASS INDEX (BMI) OF 32.0 TO 32.9 IN ADULT: ICD-10-CM

## 2023-05-08 DIAGNOSIS — F34.1 DYSTHYMIA: ICD-10-CM

## 2023-05-08 LAB
ALBUMIN SERPL BCG-MCNC: 4.1 G/DL (ref 3.5–5.2)
ALP SERPL-CCNC: 84 U/L (ref 35–104)
ALT SERPL W P-5'-P-CCNC: 9 U/L (ref 10–35)
ANION GAP SERPL CALCULATED.3IONS-SCNC: 11 MMOL/L (ref 7–15)
AST SERPL W P-5'-P-CCNC: 15 U/L (ref 10–35)
BILIRUB SERPL-MCNC: 0.6 MG/DL
BUN SERPL-MCNC: 7.4 MG/DL (ref 6–20)
CALCIUM SERPL-MCNC: 9.3 MG/DL (ref 8.6–10)
CHLORIDE SERPL-SCNC: 102 MMOL/L (ref 98–107)
CREAT SERPL-MCNC: 0.72 MG/DL (ref 0.51–0.95)
DEPRECATED HCO3 PLAS-SCNC: 24 MMOL/L (ref 22–29)
EST. AVERAGE GLUCOSE BLD GHB EST-MCNC: 117 MG/DL
GFR SERPL CREATININE-BSD FRML MDRD: >90 ML/MIN/1.73M2
GLUCOSE SERPL-MCNC: 92 MG/DL (ref 70–99)
HBA1C MFR BLD: 5.7 %
POTASSIUM SERPL-SCNC: 4.3 MMOL/L (ref 3.4–5.3)
PROT SERPL-MCNC: 7.6 G/DL (ref 6.4–8.3)
SODIUM SERPL-SCNC: 137 MMOL/L (ref 136–145)
TSH SERPL DL<=0.005 MIU/L-ACNC: 1.69 UIU/ML (ref 0.3–4.2)

## 2023-05-08 PROCEDURE — 84443 ASSAY THYROID STIM HORMONE: CPT | Mod: ZL | Performed by: NURSE PRACTITIONER

## 2023-05-08 PROCEDURE — 99214 OFFICE O/P EST MOD 30 MIN: CPT | Performed by: NURSE PRACTITIONER

## 2023-05-08 PROCEDURE — 36415 COLL VENOUS BLD VENIPUNCTURE: CPT | Mod: ZL | Performed by: NURSE PRACTITIONER

## 2023-05-08 PROCEDURE — 80053 COMPREHEN METABOLIC PANEL: CPT | Mod: ZL | Performed by: NURSE PRACTITIONER

## 2023-05-08 PROCEDURE — 83036 HEMOGLOBIN GLYCOSYLATED A1C: CPT | Mod: ZL | Performed by: NURSE PRACTITIONER

## 2023-05-08 PROCEDURE — 72040 X-RAY EXAM NECK SPINE 2-3 VW: CPT | Mod: TC,FY

## 2023-05-08 PROCEDURE — G0463 HOSPITAL OUTPT CLINIC VISIT: HCPCS

## 2023-05-08 RX ORDER — LAMOTRIGINE 200 MG/1
200 TABLET ORAL DAILY
Qty: 30 TABLET | Refills: 6 | Status: SHIPPED | OUTPATIENT
Start: 2023-05-08 | End: 2023-09-11

## 2023-05-08 RX ORDER — TOPIRAMATE 50 MG/1
50 TABLET, FILM COATED ORAL 2 TIMES DAILY
Qty: 60 TABLET | Refills: 5 | Status: SHIPPED | OUTPATIENT
Start: 2023-05-08 | End: 2023-11-03

## 2023-05-08 ASSESSMENT — ANXIETY QUESTIONNAIRES
7. FEELING AFRAID AS IF SOMETHING AWFUL MIGHT HAPPEN: SEVERAL DAYS
3. WORRYING TOO MUCH ABOUT DIFFERENT THINGS: SEVERAL DAYS
4. TROUBLE RELAXING: SEVERAL DAYS
2. NOT BEING ABLE TO STOP OR CONTROL WORRYING: SEVERAL DAYS
5. BEING SO RESTLESS THAT IT IS HARD TO SIT STILL: SEVERAL DAYS
7. FEELING AFRAID AS IF SOMETHING AWFUL MIGHT HAPPEN: SEVERAL DAYS
GAD7 TOTAL SCORE: 7
6. BECOMING EASILY ANNOYED OR IRRITABLE: SEVERAL DAYS
GAD7 TOTAL SCORE: 7
8. IF YOU CHECKED OFF ANY PROBLEMS, HOW DIFFICULT HAVE THESE MADE IT FOR YOU TO DO YOUR WORK, TAKE CARE OF THINGS AT HOME, OR GET ALONG WITH OTHER PEOPLE?: SOMEWHAT DIFFICULT
IF YOU CHECKED OFF ANY PROBLEMS ON THIS QUESTIONNAIRE, HOW DIFFICULT HAVE THESE PROBLEMS MADE IT FOR YOU TO DO YOUR WORK, TAKE CARE OF THINGS AT HOME, OR GET ALONG WITH OTHER PEOPLE: SOMEWHAT DIFFICULT
GAD7 TOTAL SCORE: 7
1. FEELING NERVOUS, ANXIOUS, OR ON EDGE: SEVERAL DAYS

## 2023-05-08 ASSESSMENT — PAIN SCALES - GENERAL: PAINLEVEL: MODERATE PAIN (5)

## 2023-05-08 ASSESSMENT — PATIENT HEALTH QUESTIONNAIRE - PHQ9
SUM OF ALL RESPONSES TO PHQ QUESTIONS 1-9: 9
SUM OF ALL RESPONSES TO PHQ QUESTIONS 1-9: 9
10. IF YOU CHECKED OFF ANY PROBLEMS, HOW DIFFICULT HAVE THESE PROBLEMS MADE IT FOR YOU TO DO YOUR WORK, TAKE CARE OF THINGS AT HOME, OR GET ALONG WITH OTHER PEOPLE: SOMEWHAT DIFFICULT

## 2023-05-08 ASSESSMENT — COLUMBIA-SUICIDE SEVERITY RATING SCALE - C-SSRS
1. WITHIN THE PAST MONTH, HAVE YOU WISHED YOU WERE DEAD OR WISHED YOU COULD GO TO SLEEP AND NOT WAKE UP?: NO
6. HAVE YOU EVER DONE ANYTHING, STARTED TO DO ANYTHING, OR PREPARED TO DO ANYTHING TO END YOUR LIFE?: NO
2. IN THE PAST MONTH, HAVE YOU ACTUALLY HAD ANY THOUGHTS OF KILLING YOURSELF?: NO

## 2023-05-08 NOTE — PROGRESS NOTES
Clinic Care Coordination Contact  Care Team Conversations    CC completed chart review following missed apt last week. Noted that pt called to reschedule apt and actually saw PCP today - denies having any questions or concerns at this time and states that her mood is good. Continues to follow with [facility] and is aware of upcoming apts. States that she made a follow up apt for august. CC will follow up in 1 month to determine any CC needs or assistance. Pt instructed to contact CC if she has any needs or concerns prior to this time. Contact info given pt verbalized understanding.     Nedra LEON RN, Care Coordinator  [facility]  [phone] Option 1

## 2023-05-09 ENCOUNTER — HOSPITAL ENCOUNTER (OUTPATIENT)
Facility: HOSPITAL | Age: 48
End: 2023-05-09
Attending: SURGERY | Admitting: SURGERY
Payer: COMMERCIAL

## 2023-05-09 ENCOUNTER — TELEPHONE (OUTPATIENT)
Dept: FAMILY MEDICINE | Facility: OTHER | Age: 48
End: 2023-05-09

## 2023-05-09 DIAGNOSIS — Z12.11 ENCOUNTER FOR SCREENING COLONOSCOPY: Primary | ICD-10-CM

## 2023-05-09 RX ORDER — BISACODYL 5 MG/1
10 TABLET, DELAYED RELEASE ORAL ONCE
Qty: 2 TABLET | Refills: 0 | Status: SHIPPED | OUTPATIENT
Start: 2023-05-09 | End: 2023-05-09

## 2023-05-09 NOTE — TELEPHONE ENCOUNTER
Screening Questions for the Scheduling of Screening Colonoscopies     (If Colonoscopy is diagnostic, Provider should review the chart before scheduling.)    Are you younger than 50 or older than 80?  Yes, 47 year old    Do you take aspirin or fish oil? yes (if yes, tell patient to stop 1 week prior to Colonoscopy)    Do you take warfarin (Coumadin), clopidogrel (Plavix), apixaban (Eliquis), dabigatram (Pradaxa), rivaroxaban (Xarelto) or any blood thinner? No    Do you use oxygen at home?  No    Do you have kidney disease? No    Are you on dialysis? No    Have you had a stroke or heart attack in the last year? No    Have you had a stent in your heart or any blood vessel in the last year? No    Have you had a transplant of any organ?  No    Have you had a colonoscopy or upper endoscopy (EGD) before?  No         Date of scheduled Colonoscopy: 7/6/23     Provider: Dr Tyrone Aggarwal [facility]

## 2023-05-09 NOTE — TELEPHONE ENCOUNTER
Patient scheduled screening colonoscopy 07/06/23 with steven Leyva bowel instructions mailed today.

## 2023-05-22 ENCOUNTER — TELEPHONE (OUTPATIENT)
Dept: FAMILY MEDICINE | Facility: OTHER | Age: 48
End: 2023-05-22

## 2023-05-23 ENCOUNTER — HOSPITAL ENCOUNTER (OUTPATIENT)
Dept: MRI IMAGING | Facility: HOSPITAL | Age: 48
Discharge: HOME | End: 2023-05-23
Attending: NURSE PRACTITIONER
Payer: MEDICAID

## 2023-05-23 DIAGNOSIS — R20.2 PARESTHESIA OF UPPER LIMB: ICD-10-CM

## 2023-05-23 DIAGNOSIS — G89.29 CHRONIC MIDLINE LOW BACK PAIN WITH BILATERAL SCIATICA: ICD-10-CM

## 2023-05-23 DIAGNOSIS — M54.42 CHRONIC MIDLINE LOW BACK PAIN WITH BILATERAL SCIATICA: ICD-10-CM

## 2023-05-23 DIAGNOSIS — M54.2 CHRONIC NECK PAIN: Primary | ICD-10-CM

## 2023-05-23 DIAGNOSIS — Z87.828 HISTORY OF TRAUMA: ICD-10-CM

## 2023-05-23 DIAGNOSIS — M54.41 CHRONIC MIDLINE LOW BACK PAIN WITH BILATERAL SCIATICA: ICD-10-CM

## 2023-05-23 DIAGNOSIS — G89.29 CHRONIC NECK PAIN: ICD-10-CM

## 2023-05-23 DIAGNOSIS — M54.2 CHRONIC NECK PAIN: ICD-10-CM

## 2023-05-23 DIAGNOSIS — M51.379 DEGENERATION OF INTERVERTEBRAL DISC OF LUMBOSACRAL REGION: ICD-10-CM

## 2023-05-23 DIAGNOSIS — G89.29 CHRONIC NECK PAIN: Primary | ICD-10-CM

## 2023-05-23 PROCEDURE — 72148 MRI LUMBAR SPINE W/O DYE: CPT

## 2023-05-23 PROCEDURE — 72141 MRI NECK SPINE W/O DYE: CPT
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
## 2023-05-24 NOTE — PROGRESS NOTES
1. Chronic neck pain  - Neurosurgery Referral- [facility] [place]  Request most recent visit notes be sent and images pushed with referral.     2. Paresthesia of upper limb  - Neurosurgery Referral    3. Degeneration of intervertebral disc of lumbosacral region  - Neurosurgery Referral    4. Chronic midline low back pain with bilateral sciatica  - Neurosurgery Referral        Results for orders placed or performed during the hospital encounter of 05/23/23   MR Lumbar Spine w/o Contrast     Status: None    Narrative    PROCEDURE: MR LUMBAR SPINE W/O CONTRAST, 5/23/2023 5:19 PM     HISTORY:Female, age 47 years, Chronic midline low back pain with  bilateral sciatica; Chronic midline low back pain with bilateral  sciatica; Chronic midline low back pain with bilateral sciatica    COMPARISON: No relevant prior imaging.    TECHNIQUE: Sagittal T1, proton density, T2 with fat saturation; axial  proton density and T2.    FINDINGS:  Lumbar spine demonstrates normal lordotic curvature.    The intervertebral disks at T11-12 and T12-L1 are normal in height and  hydration. Small Schmorl's nodes are seen at the respective endplates.    L1-2: Normal disc height with mild bulge of the annulus. Minimal  endplate and facet joint degeneration.    L2-3: Normal disc, endplates and facet joints.    L3-4: Normal disc and endplates. Minimal bilateral facet joint  degeneration.    L4-5: Moderate disc height loss and degeneration with diffuse bulge  and caudally directed right foraminal disc protrusion. This narrows  the lateral recesses with mass effect upon the intrathecal and exiting  L5 nerve roots. Mild degenerative changes seen within the endplates  and facet joints. Chronic bulge narrows the neural foramina with  bilateral ganglionic abutment, slightly worse on the left.    L5-S1: Mild disc height loss and desiccation with small posterior  central broad-based disc protrusion. Minimal endplate and mild  bilateral facet joint degenerative  change.    SI joints are congruent. Paraspinous muscles are normal in bulk and  contour. There is mild atrophy the gluteus melba muscles.  Retroperitoneal soft tissues demonstrate no evidence of  lymphadenopathy.      Impression    IMPRESSION:   Diffuse bulge of the annulus and small caudally directed right  foraminal disc protrusion at L4-5 narrowing the lateral recesses with  mass effect upon the intrathecal and exiting L5 nerve roots.    Chronic bulge and endplate spurring narrow the neural foramina at L4-5  with bilateral L4 ganglionic abutment, slightly worse on the left.    Disc and endplate degenerative changes mildly narrow the left neural  foramen and L5-S1 with left L5 abutment.    EPI JACOBO MD         SYSTEM ID:  [id number]   Results for orders placed or performed during the hospital encounter of 05/23/23   MR Cervical Spine w/o Contrast     Status: None    Narrative    PROCEDURE: MR CERVICAL SPINE W/O CONTRAST  5/23/2023 5:01 PM    HISTORY: Female, age 47 years, Neck pain; Neurologic deficit,  non-traumatic; None of the following: Babinski/clonus, balance/gait  abnormality, Griffiths's sign, hyperreflexia, or UE weakness; History of  trauma; Chronic neck pain; Chronic neck pain; Paresthesia of upper  limb    COMPARISON: No relevant prior imaging.    TECHNIQUE: Sagittal T1, proton density, T2 with fat saturation; axial  proton density and T2.    FINDINGS:  The cervical spine demonstrates a normal lordotic curvature. Cervical  spinal cord is intrinsically normal.    The intervertebral disks throughout the cervical spine are normal in  height. There is very mild desiccation of the disks at C3-4, C4-5 and  C5-6. Each of the discs also demonstrates mild bulge of the annulus.    The atlantoaxial and atlantooccipital joints are unremarkable in  appearance.    C2-3: Unremarkable    C3-4: Small posterior central disc extrusion. Mild right facet joint  degeneration.    C4-5: Small posterior central disc  protrusion. Mild left facet joint  degeneration.    C5-6: Small posterior central disc protrusion.    C6-7: Small posterior central disc protrusion. Minimal endplate and  mild bilateral facet joint degenerative change. C7-T1: Normal disc and  endplates. Mild bilateral facet joint degeneration, worse on the left.    Visualized portions of the thoracic spine are unremarkable.    Paraspinous muscles are normal in bulk and contour. Soft tissues of  the neck demonstrate no lymphadenopathy.      Impression    IMPRESSION:   Mild degenerative changes of the cervical spine without evidence of  nerve root compression or neural compromise.    No evidence of foraminal or central canal stenosis.     EPI JACOBO MD         SYSTEM ID:  [id number]

## 2023-05-25 DIAGNOSIS — M54.42 CHRONIC MIDLINE LOW BACK PAIN WITH BILATERAL SCIATICA: Primary | ICD-10-CM

## 2023-05-25 DIAGNOSIS — M54.2 CHRONIC NECK PAIN: ICD-10-CM

## 2023-05-25 DIAGNOSIS — G89.29 CHRONIC MIDLINE LOW BACK PAIN WITH BILATERAL SCIATICA: Primary | ICD-10-CM

## 2023-05-25 DIAGNOSIS — R20.2 PARESTHESIA OF UPPER LIMB: ICD-10-CM

## 2023-05-25 DIAGNOSIS — M54.41 CHRONIC MIDLINE LOW BACK PAIN WITH BILATERAL SCIATICA: Primary | ICD-10-CM

## 2023-05-25 DIAGNOSIS — M51.379 DEGENERATION OF INTERVERTEBRAL DISC OF LUMBOSACRAL REGION: ICD-10-CM

## 2023-05-25 DIAGNOSIS — G89.29 CHRONIC NECK PAIN: ICD-10-CM

## 2023-05-25 NOTE — PROGRESS NOTES
Referral placed for [facility] Neurosurgery. For chronic back and neck pain.   See most recent visit notes (5/8/23) and MRI results (5/23/23) available in [facility].   BRIGITTE Shay, CNP

## 2023-06-01 ENCOUNTER — TELEPHONE (OUTPATIENT)
Dept: FAMILY MEDICINE | Facility: OTHER | Age: 48
End: 2023-06-01

## 2023-06-06 ENCOUNTER — PATIENT OUTREACH (OUTPATIENT)
Dept: CARE COORDINATION | Facility: OTHER | Age: 48
End: 2023-06-06

## 2023-06-06 NOTE — PROGRESS NOTES
Clinic Care Coordination Contact  Care Team Conversations    CC contacted this pt to follow up with neurosurgery referral - states she has not been contacted for this yet - CC contacted referral line with [facility], they state that they have not received referral. CC requested PAC staff to assist with resending this. CC will follow up early next week to ensure this was received.     Nedra LEON RN, Care Coordinator  [facility]  [phone] Option 1

## 2023-06-12 ENCOUNTER — PATIENT OUTREACH (OUTPATIENT)
Dept: CARE COORDINATION | Facility: OTHER | Age: 48
End: 2023-06-12

## 2023-06-12 NOTE — PROGRESS NOTES
Clinic Care Coordination Contact  Care Team Conversations    CC contacted [facility] department who states they have not yet received or processed this pt neurosurgery referral at this time. Explains that this could still be in work que as they are very behind and does not recommend that CC resend referral again however to wait until Wednesday call again and determine if this has been processed. CC will follow up Wednesday.     Nedra LEON RN, Care Coordinator  [facility]  [phone] Option 1

## 2023-06-14 ENCOUNTER — PATIENT OUTREACH (OUTPATIENT)
Dept: CARE COORDINATION | Facility: OTHER | Age: 48
End: 2023-06-14

## 2023-06-14 NOTE — PROGRESS NOTES
Clinic Care Coordination Contact  Care Team Conversations    CC contacted [facility] department to determine status of referral for neurosurgery - state they have not yet received this. Requested PAC staff re-send

## 2023-06-16 ENCOUNTER — TRANSFERRED RECORDS (OUTPATIENT)
Dept: HEALTH INFORMATION MANAGEMENT | Facility: CLINIC | Age: 48
End: 2023-06-16

## 2023-06-26 RX ORDER — BISACODYL 5 MG/1
10 TABLET, DELAYED RELEASE ORAL ONCE
Qty: 2 TABLET | Refills: 0 | Status: SHIPPED | OUTPATIENT
Start: 2023-06-26 | End: 2023-06-26

## 2023-06-26 NOTE — OR NURSING
Instructed to Hold NSAIDs, ASA vitamins & Supplements starting 6/27.  Continue other prescribed medications as usual up to night before procedure. Take levetiracetam, topiramate, lamotrigine & lisinopril day of procedure.  Bowel prep Rx resent to [facility] pharmacy in [place] - Instructed to .

## 2023-06-28 RX ORDER — ONDANSETRON 4 MG/1
4 TABLET, ORALLY DISINTEGRATING ORAL EVERY 30 MIN PRN
Status: CANCELLED | OUTPATIENT
Start: 2023-06-28

## 2023-06-28 RX ORDER — ONDANSETRON 2 MG/ML
4 INJECTION INTRAMUSCULAR; INTRAVENOUS EVERY 30 MIN PRN
Status: CANCELLED | OUTPATIENT
Start: 2023-06-28

## 2023-06-28 RX ORDER — LIDOCAINE 40 MG/G
CREAM TOPICAL
Status: CANCELLED | OUTPATIENT
Start: 2023-06-28

## 2023-06-28 RX ORDER — SODIUM CHLORIDE, SODIUM LACTATE, POTASSIUM CHLORIDE, CALCIUM CHLORIDE 600; 310; 30; 20 MG/100ML; MG/100ML; MG/100ML; MG/100ML
INJECTION, SOLUTION INTRAVENOUS CONTINUOUS
Status: CANCELLED | OUTPATIENT
Start: 2023-06-28

## 2023-07-11 ENCOUNTER — PATIENT OUTREACH (OUTPATIENT)
Dept: CARE COORDINATION | Facility: OTHER | Age: 48
End: 2023-07-11

## 2023-07-11 NOTE — PROGRESS NOTES
Clinic Care Coordination Contact  Care Team Conversations    CC attempted to contact this pt - unable to reach - unsure why colonoscopy was cancelled. Does appear that neurosurgery received referral however future apt not seen - unsure if she has been contacted if if they are also unable to reach her to schedule. CC will re-attempt on a later date.     Nedra LEON RN, Care Coordinator  [facility]  [phone] Option 1

## 2023-07-14 ENCOUNTER — TELEPHONE (OUTPATIENT)
Dept: FAMILY MEDICINE | Facility: OTHER | Age: 48
End: 2023-07-14

## 2023-07-14 NOTE — TELEPHONE ENCOUNTER
Received a PA request from [employer] for levomilnacipran (FETZIMA) 80 MG 24 hr capsule. Submitted on CMM. Waiting for a response.

## 2023-07-14 NOTE — TELEPHONE ENCOUNTER
Received a DENIAL from [facility] for  levomilnacipran (FETZIMA) 80 MG 24 hr capsule.        Scanned in Epic.

## 2023-07-24 ENCOUNTER — PATIENT OUTREACH (OUTPATIENT)
Dept: CARE COORDINATION | Facility: OTHER | Age: 48
End: 2023-07-24

## 2023-07-24 NOTE — PROGRESS NOTES
Clinic Care Coordination Contact  Care Team Conversations    CC attempted to contact this pt to notify of RN CC leaving position. Letter sent with notification. No additional RN CC outreach will be completed. Program closed.

## 2023-07-24 NOTE — LETTER
[facility] CARE COORDINATION  July 24, 2023              Zeenat Blunt  [address] [address]        Dear Zeenat,    I have been unsuccessful in reaching you since our last contact. I wanted to inform you that I am leaving my position as an RN Care Coordinator [place] At this time the Care Coordination team will make no further attempts to reach you, however this does not change your ability to continue receiving care from your providers at your primary care clinic. If you need additional support from a care coordinator in the future please request an additional care coordination referral from your primary care provider at your next appointment.    All of us at [facility] are invested in your health and are here to assist you in meeting your goals.     Sincerely,    Nedra LEON RN, Care Coordinator  [facility]

## 2023-08-04 ENCOUNTER — TRANSFERRED RECORDS (OUTPATIENT)
Dept: HEALTH INFORMATION MANAGEMENT | Facility: CLINIC | Age: 48
End: 2023-08-04

## 2023-09-11 ENCOUNTER — OFFICE VISIT (OUTPATIENT)
Dept: FAMILY MEDICINE | Facility: OTHER | Age: 48
End: 2023-09-11
Attending: NURSE PRACTITIONER
Payer: MEDICAID

## 2023-09-11 VITALS
WEIGHT: 211.3 LBS | TEMPERATURE: 97.7 F | HEIGHT: 66 IN | RESPIRATION RATE: 18 BRPM | DIASTOLIC BLOOD PRESSURE: 88 MMHG | HEART RATE: 77 BPM | OXYGEN SATURATION: 99 % | SYSTOLIC BLOOD PRESSURE: 136 MMHG | BODY MASS INDEX: 33.96 KG/M2

## 2023-09-11 DIAGNOSIS — R56.9 CONVULSIONS, UNSPECIFIED CONVULSION TYPE (H): ICD-10-CM

## 2023-09-11 DIAGNOSIS — F12.20 TETRAHYDROCANNABINOL (THC) DEPENDENCE (H): ICD-10-CM

## 2023-09-11 DIAGNOSIS — E66.01 MORBID OBESITY, UNSPECIFIED OBESITY TYPE (H): ICD-10-CM

## 2023-09-11 DIAGNOSIS — F17.200 NICOTINE DEPENDENCE, UNCOMPLICATED, UNSPECIFIED NICOTINE PRODUCT TYPE: ICD-10-CM

## 2023-09-11 DIAGNOSIS — Z01.818 PREOPERATIVE EXAMINATION: Primary | ICD-10-CM

## 2023-09-11 DIAGNOSIS — I10 BENIGN ESSENTIAL HYPERTENSION: ICD-10-CM

## 2023-09-11 DIAGNOSIS — F33.1 MAJOR DEPRESSIVE DISORDER, RECURRENT EPISODE, MODERATE (H): ICD-10-CM

## 2023-09-11 DIAGNOSIS — F41.1 GENERALIZED ANXIETY DISORDER: ICD-10-CM

## 2023-09-11 PROBLEM — E87.20 METABOLIC ACIDOSIS: Status: RESOLVED | Noted: 2022-10-11 | Resolved: 2023-09-11

## 2023-09-11 LAB
ALBUMIN SERPL BCG-MCNC: 4.3 G/DL (ref 3.5–5.2)
ALP SERPL-CCNC: 80 U/L (ref 35–104)
ALT SERPL W P-5'-P-CCNC: 11 U/L (ref 0–50)
ANION GAP SERPL CALCULATED.3IONS-SCNC: 13 MMOL/L (ref 7–15)
AST SERPL W P-5'-P-CCNC: 19 U/L (ref 0–45)
BILIRUB SERPL-MCNC: 0.5 MG/DL
BUN SERPL-MCNC: 7 MG/DL (ref 6–20)
CALCIUM SERPL-MCNC: 8.8 MG/DL (ref 8.6–10)
CHLORIDE SERPL-SCNC: 101 MMOL/L (ref 98–107)
CREAT SERPL-MCNC: 0.64 MG/DL (ref 0.51–0.95)
DEPRECATED HCO3 PLAS-SCNC: 22 MMOL/L (ref 22–29)
EGFRCR SERPLBLD CKD-EPI 2021: >90 ML/MIN/1.73M2
ERYTHROCYTE [DISTWIDTH] IN BLOOD BY AUTOMATED COUNT: 16.6 % (ref 10–15)
GLUCOSE SERPL-MCNC: 92 MG/DL (ref 70–99)
HCT VFR BLD AUTO: 41 % (ref 35–47)
HGB BLD-MCNC: 13 G/DL (ref 11.7–15.7)
MCH RBC QN AUTO: 25.2 PG (ref 26.5–33)
MCHC RBC AUTO-ENTMCNC: 31.7 G/DL (ref 31.5–36.5)
MCV RBC AUTO: 80 FL (ref 78–100)
PLATELET # BLD AUTO: 301 10E3/UL (ref 150–450)
POTASSIUM SERPL-SCNC: 3.4 MMOL/L (ref 3.4–5.3)
PROT SERPL-MCNC: 7.7 G/DL (ref 6.4–8.3)
RBC # BLD AUTO: 5.16 10E6/UL (ref 3.8–5.2)
SODIUM SERPL-SCNC: 136 MMOL/L (ref 136–145)
WBC # BLD AUTO: 8.2 10E3/UL (ref 4–11)

## 2023-09-11 PROCEDURE — 82310 ASSAY OF CALCIUM: CPT | Mod: ZL | Performed by: NURSE PRACTITIONER

## 2023-09-11 PROCEDURE — 99406 BEHAV CHNG SMOKING 3-10 MIN: CPT | Performed by: NURSE PRACTITIONER

## 2023-09-11 PROCEDURE — 2894A PR SMOKING CESSATION COUNSELING, 3-10 MIN: CPT | Performed by: NURSE PRACTITIONER

## 2023-09-11 PROCEDURE — 80175 DRUG SCREEN QUAN LAMOTRIGINE: CPT | Mod: ZL | Performed by: NURSE PRACTITIONER

## 2023-09-11 PROCEDURE — 85027 COMPLETE CBC AUTOMATED: CPT | Mod: ZL | Performed by: NURSE PRACTITIONER

## 2023-09-11 PROCEDURE — G0463 HOSPITAL OUTPT CLINIC VISIT: HCPCS | Mod: 25

## 2023-09-11 PROCEDURE — 93005 ELECTROCARDIOGRAM TRACING: CPT | Performed by: NURSE PRACTITIONER

## 2023-09-11 PROCEDURE — 93010 ELECTROCARDIOGRAM REPORT: CPT | Mod: 77 | Performed by: INTERNAL MEDICINE

## 2023-09-11 PROCEDURE — 99214 OFFICE O/P EST MOD 30 MIN: CPT | Performed by: NURSE PRACTITIONER

## 2023-09-11 PROCEDURE — 82947 ASSAY GLUCOSE BLOOD QUANT: CPT | Mod: ZL | Performed by: NURSE PRACTITIONER

## 2023-09-11 PROCEDURE — 36415 COLL VENOUS BLD VENIPUNCTURE: CPT | Mod: ZL | Performed by: NURSE PRACTITIONER

## 2023-09-11 PROCEDURE — 82374 ASSAY BLOOD CARBON DIOXIDE: CPT | Mod: ZL | Performed by: NURSE PRACTITIONER

## 2023-09-11 RX ORDER — ONDANSETRON 4 MG/1
4 TABLET, FILM COATED ORAL EVERY 6 HOURS PRN
COMMUNITY
Start: 2023-07-24

## 2023-09-11 RX ORDER — LAMOTRIGINE 200 MG/1
200 TABLET ORAL DAILY
Qty: 30 TABLET | Refills: 6 | Status: SHIPPED | OUTPATIENT
Start: 2023-09-11 | End: 2023-12-04

## 2023-09-11 RX ORDER — ACETAMINOPHEN 325 MG/1
325-650 TABLET ORAL EVERY 4 HOURS PRN
COMMUNITY
Start: 2023-07-24

## 2023-09-11 ASSESSMENT — ANXIETY QUESTIONNAIRES
2. NOT BEING ABLE TO STOP OR CONTROL WORRYING: SEVERAL DAYS
GAD7 TOTAL SCORE: 7
3. WORRYING TOO MUCH ABOUT DIFFERENT THINGS: SEVERAL DAYS
7. FEELING AFRAID AS IF SOMETHING AWFUL MIGHT HAPPEN: SEVERAL DAYS
IF YOU CHECKED OFF ANY PROBLEMS ON THIS QUESTIONNAIRE, HOW DIFFICULT HAVE THESE PROBLEMS MADE IT FOR YOU TO DO YOUR WORK, TAKE CARE OF THINGS AT HOME, OR GET ALONG WITH OTHER PEOPLE: SOMEWHAT DIFFICULT
5. BEING SO RESTLESS THAT IT IS HARD TO SIT STILL: SEVERAL DAYS
1. FEELING NERVOUS, ANXIOUS, OR ON EDGE: SEVERAL DAYS
4. TROUBLE RELAXING: SEVERAL DAYS
GAD7 TOTAL SCORE: 7
6. BECOMING EASILY ANNOYED OR IRRITABLE: SEVERAL DAYS

## 2023-09-11 ASSESSMENT — PATIENT HEALTH QUESTIONNAIRE - PHQ9
SUM OF ALL RESPONSES TO PHQ QUESTIONS 1-9: 8
10. IF YOU CHECKED OFF ANY PROBLEMS, HOW DIFFICULT HAVE THESE PROBLEMS MADE IT FOR YOU TO DO YOUR WORK, TAKE CARE OF THINGS AT HOME, OR GET ALONG WITH OTHER PEOPLE: SOMEWHAT DIFFICULT
SUM OF ALL RESPONSES TO PHQ QUESTIONS 1-9: 8

## 2023-09-11 ASSESSMENT — PAIN SCALES - GENERAL: PAINLEVEL: MODERATE PAIN (5)

## 2023-09-11 NOTE — PATIENT INSTRUCTIONS
MEDICATIONS:   Do not start any new medications prior to your surgery.  Stop all supplements (herbal, non-prescription vitamins and minerals) two weeks prior to surgery.     Below are instructions for medications as reported during your visit:    Current Outpatient Medications   Medication   continue acetaminophen (TYLENOL) 325 MG tablet   Stop at least 1 day before surgery ibuprofen (ADVIL/MOTRIN) 600 MG tablet   Continue. Take with small sip of water day of surgery.  lamoTRIgine (LAMICTAL) 200 MG tablet   Continue. Take with small sip of water day of surgery. levETIRAcetam (KEPPRA) 1000 MG tablet   continue levomilnacipran (FETZIMA) 80 MG 24 hr capsule   Do not take AM of surgery lisinopril (ZESTRIL) 5 MG tablet   Do not take prior to surgery multivitamin  with iron (SM COMPLETE ADVANCED FORMULA) TABS   continue nicotine (NICORETTE) 2 MG gum   continue ondansetron (ZOFRAN) 4 MG tablet   continue topiramate (TOPAMAX) 50 MG tablet   Hold 2 days prior to surgery zaleplon (SONATA) 10 MG capsule
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
## 2023-09-14 LAB — LAMOTRIGINE SERPL-MCNC: <0.9 UG/ML

## 2023-09-27 ENCOUNTER — OFFICE VISIT (OUTPATIENT)
Dept: FAMILY MEDICINE | Facility: OTHER | Age: 48
End: 2023-09-27
Attending: NURSE PRACTITIONER
Payer: MEDICAID

## 2023-09-27 ENCOUNTER — LAB (OUTPATIENT)
Dept: FAMILY MEDICINE | Facility: OTHER | Age: 48
End: 2023-09-27

## 2023-09-27 VITALS
OXYGEN SATURATION: 98 % | RESPIRATION RATE: 20 BRPM | HEART RATE: 82 BPM | BODY MASS INDEX: 33.57 KG/M2 | TEMPERATURE: 97.6 F | SYSTOLIC BLOOD PRESSURE: 148 MMHG | DIASTOLIC BLOOD PRESSURE: 82 MMHG | WEIGHT: 208 LBS

## 2023-09-27 DIAGNOSIS — Z12.11 SCREEN FOR COLON CANCER: ICD-10-CM

## 2023-09-27 DIAGNOSIS — F41.1 GENERALIZED ANXIETY DISORDER: ICD-10-CM

## 2023-09-27 DIAGNOSIS — F44.81 DISSOCIATIVE IDENTITY DISORDER (H): ICD-10-CM

## 2023-09-27 DIAGNOSIS — M65.4 DE QUERVAIN'S DISEASE (TENOSYNOVITIS): Primary | ICD-10-CM

## 2023-09-27 DIAGNOSIS — M25.50 MULTIPLE JOINT PAIN: ICD-10-CM

## 2023-09-27 PROCEDURE — 99213 OFFICE O/P EST LOW 20 MIN: CPT | Performed by: NURSE PRACTITIONER

## 2023-09-27 PROCEDURE — G0463 HOSPITAL OUTPT CLINIC VISIT: HCPCS | Performed by: NURSE PRACTITIONER

## 2023-09-27 RX ORDER — MULTIVITAMIN WITH FOLIC ACID 400 MCG
TABLET ORAL
COMMUNITY
Start: 2023-09-19 | End: 2024-02-27

## 2023-09-27 ASSESSMENT — PAIN SCALES - GENERAL: PAINLEVEL: MODERATE PAIN (4)

## 2023-09-27 NOTE — COMMUNITY RESOURCES LIST (ENGLISH)
09/27/2023   [facility]  N/A  For additional resource needs, please contact your health insurance member services or your primary care team.  Phone: [phone]   Email: N/A   Address: [address] [place] [address]   Hours: N/A        Food and Nutrition       Food pantry  1  [facility] [employer] [facility] Free Pantry Distance: 0.77 miles      Pickup   [address] [place] [address]  Language: English  Hours: Mon - Sun Open 24 Hours  Fees: Free   Phone: (517) 895-1333 Email: ana m@[employer].org Website: http://www.[employer].org     2  [facility] and Service [place] Distance: 19.39 miles      Pickup   [address] [place] [address]  Language: English  Hours: Mon 9:00 AM - 11:00 AM , Tue 1:00 PM - 3:00 PM , Wed - Thu 9:00 AM - 11:00 AM  Fees: Free, Self Pay   Phone: (885) 226-4753 Email: ean@[facility].org Website: https://[facility].org/northern/[place]     SNAP application assistance  3  [facility] Economic Services & [facility] Distance: 0.75 miles      In-Person, Phone/Virtual   [address] [place] [address]  Language: English  Hours: Mon - Fri 8:00 AM - 4:30 PM  Fees: Free   Phone: (482) 945-3342 Email: financialassistance@[facility] Website: https://www.[facility].gov/pbxigepvwer-e-o/[facility]-health-human-services/economic-services-supports     [address] [place] [address] Economic Services & [facility] Distance: 18.55 miles      In-Person, Phone/Virtual   [address] [place] [address]  Language: English  Hours: Mon - Fri 8:00 AM - 4:30 PM  Fees: Free   Phone: (366) 192-3368 Website: https://www.Bijk.comn.gov/zqbedgliuwq-b-a/public-health-human-services/economic-services-supports     Soup kitchen or free meals  5  [facility] [place]  [facility] and Service Center Distance: 19.39 miles      Pickup   [address] [place] [address]  Language: English  Hours: Mon - Fri 4:00 PM - 4:45 PM  Fees: Free   Phone: (447) 140-8328 Email: ean@[facility].[employer] Website: https://centralusa.[facility].org/northern/[place]          Important Numbers & Websites       [facility]   [address] 211itedway.org  Poison Control   (907) 162-3333 [place]  Suicide and Crisis Lifeline   [address] [place], [place] [address]line.org  Childhelp [place] Child Abuse Hotline   965.846.5540 Childhelphotline.org  [place] Sexual Assault Hotline   (302) 915-8825 (HOPE) [facility] Runaway Safeline   (960) 542-4005 (RUNAWAY) [facility] Dam[facility]away.org  Pregnancy & Postpartum Support [place]   Call/text 499-328-3191 Ppsupportmn.org  Substance Abuse National Helpline ([facility]   652-251-HELP (0652) Findtreatment.gov  Emergency Services   919

## 2023-09-27 NOTE — COMMUNITY RESOURCES LIST (ENGLISH)
09/27/2023   [facility]  N/A  For questions about this resource list or additional care needs, please contact your primary care clinic or care manager.  Phone: [phone]   Email: N/A   Address: [address] [place] [address]   Hours: N/A        Food and Nutrition       Food pantry  1  [facility] [employer] [facility] Free Pantry Distance: 0.77 miles      Pickup   [address] [place] [address]  Language: English  Hours: Mon - Sun Open 24 Hours  Fees: Free   Phone: (983) 776-7200 Email: ana m@[employer].org Website: http://www.[employer].org     2  [facility] Service [place] Distance: 19.39 miles      Pickup   [address] [place] [address]  Language: English  Hours: Mon 9:00 AM - 11:00 AM , Tue 1:00 PM - 3:00 PM , Wed - Thu 9:00 AM - 11:00 AM  Fees: Free, Self Pay   Phone: (144) 973-5693 Email: ean@[facility].org Website: https://[facility].org/northern/[place]     SNAP application assistance  3  [facility] Human [facility] Economic Services & [facility] Distance: 0.75 miles      In-Person, Phone/Virtual   [address] [place] [address]  Language: English  Hours: Mon - Fri 8:00 AM - 4:30 PM  Fees: Free   Phone: (949) 398-8045 Email: financialassistance@[facility].gov Website: https://www.[facility] Hospitalco[facility] Centern.gov/ebhamfvwrkj-j-v/public-[facility]-human-services/economic-services-supports     [address] [place] [address] Economic Services & [facility] Distance: 18.55 miles      In-Person, Phone/Virtual   [address] [place] [address]  Language: English  Hours: Mon - Fri 8:00 AM - 4:30 PM  Fees: Free   Phone: (132) 830-5946 Website: https://www.[place].gov/iziwihfralj-q-w/public-health-human-services/economic-services-supports     Soup kitchen or free meals  5  [facility]  [facility] Distance: 19.39 miles      Pickup   [address] [place] [address]  Language: English  Hours: Mon - Fri 4:00 PM - 4:45 PM  Fees: Free   Phone: (514) 333-1873 Email: ean@[facility].[employer] Website: https://centralusa.[facility].org/northern/[place]          Important Numbers & Websites       Emergency Services   911  [facility] Services   311  Poison Control   (839) 655-2265  Suicide Prevention Lifeline   (273) 156-4682 (TALK)  Child Abuse Hotline   (839) 771-9452 (4-A-Child)  Sexual Assault Hotline   (433) 650-9953 (HOPE)  National Runaway Safeline   (535) 747-2529 (RUNAWAY)  All-Options Talkline   (193) 405-1775  Substance Abuse Referral   (823) 851-1272 (HELP)

## 2023-09-27 NOTE — PROGRESS NOTES
Assessment & Plan     De Quervain's disease (tenosynovitis)  - Wrist/Arm Supplies Order Wrist Brace; Left; non-thumb spica  - diclofenac (VOLTAREN) 1 % topical gel; Apply 4 g topically 4 times daily for 30 days    Multiple joint pain  - Anti Nuclear Anette IgG by IFA with Reflex; Future  - Rheumatoid factor; Future  - CRP, inflammation; Future  - diclofenac (VOLTAREN) 1 % topical gel; Apply 4 g topically 4 times daily for 30 days    Generalized anxiety disorder  Zeenat has yet to establish with a mental leonard provider. This has been discussed previously. I am placing another referral. Currently stable but would benefit from having a mental health provider for medication management.   - Adult Mental Health  Referral; Future    Dissociative identity disorder (H)  - Adult Mental Health  Referral; Future    Screen for colon cancer- RAMSES([employer]); Future    I spent a total of 25 minutes on the day of the visit.   Time spent by me doing chart review, history and exam, documentation and further activities per the note           No follow-ups on file.    Gaby Mitchell, CNP  [facility] [place] [facility]    David Epperson is a 48 year old, presenting for the following health issues:  Musculoskeletal Problem      HPI   Concern - bilateral wrist pain - radiates up arm to elbow.   Onset: x 1 month   Description: pain in bilateral wrist- increases with movement   Intensity: moderate  Progression of Symptoms:  worsening  Accompanying Signs & Symptoms: none   Previous history of similar problem: no  Precipitating factors:        Worsened by: movement   Alleviating factors:        Improved by: none  Therapies tried and outcome: hot bath, hot packs/pad    Also reports multiple joint pain and stiffness usually during night and in AM.  knees, wrists, elbows.   Denies fingers, toes, ankles, neck pain.     No fevers or chills. No redness to joints. Denies any consistent joint swelling.  "        Previous imaging  XR wrist left 1/24/2018 - [facility]  Impression: \"negative left wrist\"    Smoking- working to quit. Using nicotine gum. Using 2 pieces a day. Cut down to 3-4 cigarettes a day. Daughter is \"dosing only up to 4 a day.    Review of Systems   Constitutional, HEENT, cardiovascular, pulmonary, gi and gu systems are negative, except as otherwise noted.      Objective    BP (!) 148/82   Pulse 82   Temp 97.6  F (36.4  C) (Tympanic)   Resp 20   Wt 94.3 kg (208 lb)   LMP 09/10/2023 (Exact Date)   SpO2 98%   BMI 33.57 kg/m    Body mass index is 33.57 kg/m .    Physical Exam   GENERAL: healthy, alert and no distress  EYES: Eyes grossly normal to inspection, PERRL and conjunctivae and sclerae normal  MS: No visible deformity or discoloration or temperature changes within the upper extremities.  There is tenderness proximal to the base of the left thumb and pain reported with making fist and with Finkelstein test. This is present on right as well but less so.  Radial pulses strong 2+ bilaterally. ROM of all digits present. No RA nodules noted.   NEURO: Normal strength and tone, mentation intact and speech normal  PSYCH: mentation appears normal, affect normal/bright        No results found for any visits on 09/27/23.                "

## 2023-10-26 ENCOUNTER — TRANSFERRED RECORDS (OUTPATIENT)
Dept: HEALTH INFORMATION MANAGEMENT | Facility: CLINIC | Age: 48
End: 2023-10-26

## 2023-11-02 DIAGNOSIS — Z87.898 HISTORY OF SEIZURES: ICD-10-CM

## 2023-11-03 RX ORDER — TOPIRAMATE 50 MG/1
50 TABLET, FILM COATED ORAL 2 TIMES DAILY
Qty: 180 TABLET | Refills: 0 | Status: SHIPPED | OUTPATIENT
Start: 2023-11-03 | End: 2024-01-29

## 2023-11-03 RX ORDER — LEVETIRACETAM 1000 MG/1
TABLET ORAL
Qty: 180 TABLET | Refills: 0 | Status: SHIPPED | OUTPATIENT
Start: 2023-11-03 | End: 2024-01-29

## 2023-11-03 NOTE — TELEPHONE ENCOUNTER
TOPOMAX      Last Written Prescription Date:  5-8-23  Last Fill Quantity: 60,   # refills: 5  Last Office Visit: 9-27-23  Future Office visit:      KEP[facility]      Last Written Prescription Date:  12-6-2022  Last Fill Quantity: 60,   # refills: 11  Last Office Visit: 9-27-23  Future Office visit:    Next 5 appointments (look out 90 days)      Dec 12, 2023 10:00 AM  (Arrive by 9:45 AM)  SHORT with Gaby Mitchell CNP  [facility] ([facility] ) [address]  [facility] [address]  [phone]             Routing refill request to provider for review/approval because:  Drug not on the [facility], [facility] or [facility] refill protocol or controlled substance

## 2023-11-09 ENCOUNTER — ANCILLARY PROCEDURE (OUTPATIENT)
Dept: GENERAL RADIOLOGY | Facility: OTHER | Age: 48
End: 2023-11-09
Attending: NURSE PRACTITIONER
Payer: MEDICAID

## 2023-11-09 ENCOUNTER — OFFICE VISIT (OUTPATIENT)
Dept: FAMILY MEDICINE | Facility: OTHER | Age: 48
End: 2023-11-09
Attending: NURSE PRACTITIONER
Payer: MEDICAID

## 2023-11-09 VITALS
WEIGHT: 218 LBS | SYSTOLIC BLOOD PRESSURE: 122 MMHG | OXYGEN SATURATION: 98 % | DIASTOLIC BLOOD PRESSURE: 70 MMHG | HEART RATE: 82 BPM | RESPIRATION RATE: 18 BRPM | BODY MASS INDEX: 35.19 KG/M2 | TEMPERATURE: 97.9 F

## 2023-11-09 DIAGNOSIS — M25.532 LEFT WRIST PAIN: ICD-10-CM

## 2023-11-09 DIAGNOSIS — M25.532 LEFT WRIST PAIN: Primary | ICD-10-CM

## 2023-11-09 DIAGNOSIS — M65.4 DE QUERVAIN'S DISEASE (TENOSYNOVITIS): ICD-10-CM

## 2023-11-09 PROCEDURE — G0463 HOSPITAL OUTPT CLINIC VISIT: HCPCS

## 2023-11-09 PROCEDURE — 73110 X-RAY EXAM OF WRIST: CPT | Mod: TC,LT,FY

## 2023-11-09 PROCEDURE — 99212 OFFICE O/P EST SF 10 MIN: CPT | Performed by: NURSE PRACTITIONER

## 2023-11-09 ASSESSMENT — PAIN SCALES - GENERAL: PAINLEVEL: SEVERE PAIN (7)

## 2023-11-09 NOTE — PROGRESS NOTES
"  Assessment & Plan     Left wrist pain  Xray is normal this visit.   - XR Wrist Left G/E 3 Views (Clinic Performed); Future  - Orthopedic  Referral; Future    De Quervain's disease (tenosynovitis)  - Orthopedic  Referral; Future    Ordering of each unique test  No LOS data to display   Time spent by me doing chart review, history and exam, documentation and further activities per the note     BMI:   Estimated body mass index is 35.19 kg/m  as calculated from the following:    Height as of 9/11/23: 1.676 m (5' 6\").    Weight as of this encounter: 98.9 kg (218 lb).       Return if symptoms worsen or fail to improve.    Gaby Mitchell, CNP  [facility] - CRISTIAN Epperson is a 48 year old, presenting for the following health issues:  Musculoskeletal Problem      HPI     Pain History:  When did you first notice your pain? Over a month ago  Have you seen anyone else for your pain? No  How has your pain affected your ability to work? Pain does not limit ability to work   Where in your body do you have pain? Musculoskeletal problem/pain  Onset/Duration: over 1 month  Description  Location: wrist - left  Joint Swelling: YES  Redness: YES  Pain: YES  Warmth: YES  Intensity:  7/10  Progression of Symptoms:  worsening  Accompanying signs and symptoms:   Fevers: No  Numbness/tingling/weakness: YES, weakness  History  Trauma to the area: No  Recent illness:  No  Previous similar problem: YES  Precipitating or alleviating factors:  Aggravating factors include: overuse  Therapies tried and outcome: support wrap and Ibuprofen        Review of Systems   Constitutional, HEENT, cardiovascular, pulmonary, gi and gu systems are negative, except as otherwise noted.      Objective    /70 (BP Location: Right arm, Patient Position: Sitting, Cuff Size: Adult Regular)   Pulse 82   Temp 97.9  F (36.6  C) (Tympanic)   Resp 18   Wt 98.9 kg (218 lb)   LMP 09/10/2023 (Exact Date)   SpO2 98%  "  BMI 35.19 kg/m    Body mass index is 35.19 kg/m .    Physical Exam   GENERAL: healthy, alert and no distress  MS: no visible deformity or discoloration. Tenderness over base of right thumb and radial side of wrist. Positive finkelstein's test. Opposition test normal in ROM. Zeenat does report pain with this test.           Results for orders placed or performed in visit on 11/09/23   XR Wrist Left G/E 3 Views (Clinic Performed)     Status: None    Narrative    XR WRIST LEFT G/E 3 VIEWS    HISTORY: 48 years Female Left wrist pain    COMPARISON: None    TECHNIQUE: 4 views (    FINDINGS: Joint spaces are congruent. Articular surfaces are smooth.  There is no evidence of fracture or dislocation.      Impression    IMPRESSION: No evidence of fracture dislocation or significant  arthritic change.    NETTA PASCUAL MD         SYSTEM ID:  [id number]

## 2023-11-09 NOTE — PATIENT INSTRUCTIONS
Follow up with [facility] in the next couple weeks. We will have your xray sent to them for review.   Continue with the wrist brace, especially at night.

## 2023-11-20 ENCOUNTER — LAB (OUTPATIENT)
Dept: FAMILY MEDICINE | Facility: OTHER | Age: 48
End: 2023-11-20

## 2023-11-20 ENCOUNTER — OFFICE VISIT (OUTPATIENT)
Dept: FAMILY MEDICINE | Facility: OTHER | Age: 48
End: 2023-11-20
Attending: NURSE PRACTITIONER
Payer: MEDICAID

## 2023-11-20 VITALS
OXYGEN SATURATION: 98 % | RESPIRATION RATE: 17 BRPM | DIASTOLIC BLOOD PRESSURE: 84 MMHG | TEMPERATURE: 97.5 F | SYSTOLIC BLOOD PRESSURE: 134 MMHG | WEIGHT: 215.9 LBS | BODY MASS INDEX: 34.85 KG/M2 | HEART RATE: 80 BPM

## 2023-11-20 DIAGNOSIS — Z02.89 ENCOUNTER FOR COMPLETION OF FORM WITH PATIENT: Primary | ICD-10-CM

## 2023-11-20 DIAGNOSIS — I10 BENIGN ESSENTIAL HYPERTENSION: ICD-10-CM

## 2023-11-20 DIAGNOSIS — R56.9 CONVULSIONS, UNSPECIFIED CONVULSION TYPE (H): ICD-10-CM

## 2023-11-20 DIAGNOSIS — Z12.11 COLON CANCER SCREENING: ICD-10-CM

## 2023-11-20 DIAGNOSIS — M25.50 MULTIPLE JOINT PAIN: ICD-10-CM

## 2023-11-20 LAB
CRP SERPL-MCNC: <3 MG/L
HOLD SPECIMEN: NORMAL
RHEUMATOID FACT SERPL-ACNC: <10 IU/ML

## 2023-11-20 PROCEDURE — 99214 OFFICE O/P EST MOD 30 MIN: CPT | Performed by: NURSE PRACTITIONER

## 2023-11-20 PROCEDURE — 86140 C-REACTIVE PROTEIN: CPT | Mod: ZL | Performed by: NURSE PRACTITIONER

## 2023-11-20 PROCEDURE — 86431 RHEUMATOID FACTOR QUANT: CPT | Mod: ZL | Performed by: NURSE PRACTITIONER

## 2023-11-20 PROCEDURE — 86038 ANTINUCLEAR ANTIBODIES: CPT | Mod: ZL | Performed by: NURSE PRACTITIONER

## 2023-11-20 PROCEDURE — G0463 HOSPITAL OUTPT CLINIC VISIT: HCPCS

## 2023-11-20 PROCEDURE — 36415 COLL VENOUS BLD VENIPUNCTURE: CPT | Mod: ZL | Performed by: NURSE PRACTITIONER

## 2023-11-20 RX ORDER — LISINOPRIL 10 MG/1
10 TABLET ORAL DAILY
Qty: 30 TABLET | Refills: 11 | Status: SHIPPED | OUTPATIENT
Start: 2023-11-20 | End: 2023-11-20

## 2023-11-20 RX ORDER — LISINOPRIL 10 MG/1
10 TABLET ORAL DAILY
Qty: 30 TABLET | Refills: 11 | Status: SHIPPED | OUTPATIENT
Start: 2023-11-20

## 2023-11-20 RX ORDER — LISINOPRIL 5 MG/1
5 TABLET ORAL 2 TIMES DAILY
Qty: 180 TABLET | Refills: 3 | Status: CANCELLED | OUTPATIENT
Start: 2023-11-20

## 2023-11-20 ASSESSMENT — ANXIETY QUESTIONNAIRES
7. FEELING AFRAID AS IF SOMETHING AWFUL MIGHT HAPPEN: SEVERAL DAYS
4. TROUBLE RELAXING: SEVERAL DAYS
5. BEING SO RESTLESS THAT IT IS HARD TO SIT STILL: SEVERAL DAYS
1. FEELING NERVOUS, ANXIOUS, OR ON EDGE: SEVERAL DAYS
3. WORRYING TOO MUCH ABOUT DIFFERENT THINGS: SEVERAL DAYS
6. BECOMING EASILY ANNOYED OR IRRITABLE: SEVERAL DAYS
2. NOT BEING ABLE TO STOP OR CONTROL WORRYING: SEVERAL DAYS
GAD7 TOTAL SCORE: 7
IF YOU CHECKED OFF ANY PROBLEMS ON THIS QUESTIONNAIRE, HOW DIFFICULT HAVE THESE PROBLEMS MADE IT FOR YOU TO DO YOUR WORK, TAKE CARE OF THINGS AT HOME, OR GET ALONG WITH OTHER PEOPLE: SOMEWHAT DIFFICULT
GAD7 TOTAL SCORE: 7

## 2023-11-20 ASSESSMENT — PATIENT HEALTH QUESTIONNAIRE - PHQ9
SUM OF ALL RESPONSES TO PHQ QUESTIONS 1-9: 4
10. IF YOU CHECKED OFF ANY PROBLEMS, HOW DIFFICULT HAVE THESE PROBLEMS MADE IT FOR YOU TO DO YOUR WORK, TAKE CARE OF THINGS AT HOME, OR GET ALONG WITH OTHER PEOPLE: SOMEWHAT DIFFICULT
SUM OF ALL RESPONSES TO PHQ QUESTIONS 1-9: 4

## 2023-11-20 ASSESSMENT — PAIN SCALES - GENERAL: PAINLEVEL: NO PAIN (0)

## 2023-11-20 NOTE — COMMUNITY RESOURCES LIST (ENGLISH)
11/20/2023   [facility]  N/A  For questions about this resource list or additional care needs, please contact your primary care clinic or care manager.  Phone: [phone]   Email: N/A   Address: [address] [place] [address]   Hours: N/A        Financial Stability       Utility payment assistance  1  [facility] Distance: 0.77 miles      In-Person   [address]  [place] [address]  Language: English  Hours: Mon - Fri 8:00 AM - 4:30 PM  Fees: Free   Phone: (565) 470-3270 Email: ana m@[employer] Website: http://www.[employer]otClip     2  [facility] and Service [place] Distance: 19.39 miles      In-Person   [address] [place] [address]  Language: English  Hours: Mon - Fri 9:00 AM - 12:00 PM , Mon - Fri 1:00 PM - 5:00 PM  Fees: Free, Self Pay   Phone: (106) 704-1300 Email: ean@[facility].[employer]Clip Website: https://zLense.[employer].org/northern/[place]          Food and Nutrition       Food pantry  3  [facility] Office - [facility] Free Pantry Distance: 0.77 miles      Pickup   [address] [place] [address]  Language: English  Hours: Mon - Sun Open 24 Hours  Fees: Free   Phone: (983) 484-4276 Email: ana m@[employer] Website: http://www.[employer].ShotClip     4  [facility] Service [place] Distance: 19.39 miles      Pickup   [address] [place] [address]  Language: English  Hours: Mon 9:00 AM - 11:00 AM , Tue 1:00 PM - 3:00 PM , Wed - Thu 9:00 AM - 11:00 AM  Fees: Free, Self Pay   Phone: (301) 869-3732 Email: ean@[facility] City[facility].org Website: https://zLense.[employer].org/northern/[place]     SNAP application assistance  5  [facility] - Economic Services & Supports - [place] Distance: 0.75 miles      In-Person, Phone/Virtual   [address] [place] [address]  Language: English  Hours: Mon - Fri 8:00 AM - 4:30 PM  Fees: Free   Phone: (395) 119-1237 Email: financialassistance@[facility] Website: https://www.[facility]/qffbrnxnvsw-v-j/public-health-human-services/economic-services-supports     6  [facility] - Economic Services & Supports - [employer] Distance: 18.55 miles      In-Person, Phone/Virtual   [address] [place] [address]  Language: English  Hours: Mon - Fri 8:00 AM - 4:30 PM  Fees: Free   Phone: (620) 574-4239 Website: https://www.[facility]/qhgxmzunhot-j-h/[facility]-human-services/economic-services-supports     Soup kitchen or free meals  7  [facility] and Service [place] Distance: 19.39 miles      [facility]   [address] [place] [address]  Language: English  Hours: Mon - Fri 4:00 PM - 4:45 PM  Fees: Free   Phone: (209) 519-9505 Email: ean@[facility].org Website: https://[facility].org/northern/[employer]          Important Numbers & Websites       Emergency Services   911  [facility] Services   311  Poison Control   (964) 871-6579  Suicide Prevention Lifeline   (173) 550-2080 (TALK)  Child Abuse Hotline   (610) 201-3075 (4-A-Child)  Sexual Assault Hotline   (639) 732-2192 (HOPE)  National Runaway Safeline   (672) 522-3509 (RUNAWAY)  All-Options Talkline   (736) 460-7424  Substance Abuse Referral   (674) 915-4612 (HELP)

## 2023-11-20 NOTE — LETTER
November 20, 2023      Zeenat Blunt  [address] [place] [address]        To Whom It May Concern,     Zeenat Blunt is currently under my care. Please consider her tan and black dog as her emotion support animal. It is my professional medical opinion that she qualifies for having an emotional support animal.        Sincerely,        Gaby Mitchell, CNP

## 2023-11-20 NOTE — PROGRESS NOTES
"  Assessment & Plan     Encounter for completion of form with patient  Letter provided for emotional support animal    Benign essential hypertension  refill  - lisinopril (ZESTRIL) 10 MG tablet; Take 1 tablet (10 mg) by mouth daily    Multiple joint pain   - Anti Nuclear Anette IgG by IFA with Reflex  - Rheumatoid factor  - CRP, inflammation    Colon cancer screening  Cologaurd ordered.    Convulsions, unspecified convulsion type (H)  - Adult Neurology  Referral    Ordering of each unique test  Prescription drug management  No LOS data to display   Time spent by me doing chart review, history and exam, documentation and further activities per the note       BMI:   Estimated body mass index is 34.85 kg/m  as calculated from the following:    Height as of 9/11/23: 1.676 m (5' 6\").    Weight as of this encounter: 97.9 kg (215 lb 14.4 oz).   Weight management plan: Discussed healthy diet and exercise guidelines        No follow-ups on file.    Gaby Mitchell, CNP  [facility] [place] [facility]    Subjective   Zeenat is a 48 year old, presenting for the following health issues:  discuss letter      HPI     Concern - Discuss Pet Letter   Would like to have a pet for emotional support. Has been struggling with mental health (PTSD, depression, and hx of seizures) and feeling very isolated heading into winter.         Zeenat has labs that were ordered in September (Cologuard, , Rheumatoid Factor, and ROMERO). Encouraged to have these completed.     Hypertension- on lisinopril 5 mg twice day. Gets her medications in a blister pack monthly from the pharmacy.   Due for refill.     History of seizures-    Longstanding control via topiramate and levetiracetam. Also has been on longstanding lamotrigine for depression.   Last seizure was about 5 months ago. Did not require ER visit.   Last visit was with neurology at [facility] 1/28/2016. Notes reviewed. Was to follow up in 4 months. This has not been done. Referral for " neurology was placed in 2020, but again, she is overdue for this. We disucssed options for referral and she is open to meeting with [facility] again. Referral placed.           5/8/2023     9:57 AM 9/11/2023     1:07 PM 11/20/2023     8:44 AM   PHQ   PHQ-9 Total Score 9 8 4   Q9: Thoughts of better off dead/self-harm past 2 weeks Several days Not at all Not at all   F/U: Thoughts of suicide or self-harm No     F/U: Safety concerns No            5/8/2023     9:57 AM 9/11/2023     1:09 PM 11/20/2023     8:45 AM   JOHN-7 SCORE   Total Score 7 (mild anxiety) 7 (mild anxiety) 7 (mild anxiety)   Total Score 7 7 7          Health Maintenance  Mammogram: ordered previously. Has not scheduled. Willing to have done in [place]. We will have someone call for scheduling.   Colorectal Cancer Screening: ordered previously. Zeenat believes the kit went to the wrong address. Reordered.    Review of Systems   Constitutional, HEENT, cardiovascular, pulmonary, gi and gu systems are negative, except as otherwise noted.      Objective    /84 (BP Location: Right arm, Patient Position: Sitting, Cuff Size: Adult Large)   Pulse 80   Temp 97.5  F (36.4  C) (Tympanic)   Resp 17   Wt 97.9 kg (215 lb 14.4 oz)   SpO2 98%   BMI 34.85 kg/m    Body mass index is 34.85 kg/m .  Physical Exam   GENERAL: healthy, alert and no distress  EYES: Eyes grossly normal to inspection, PERRL and conjunctivae and sclerae normal  NECK: no adenopathy, no asymmetry, masses, or scars and thyroid normal to palpation  RESP: lungs clear to auscultation - no rales, rhonchi or wheezes  CV: regular rate and rhythm, normal S1 S2, no S3 or S4, no murmur, click or rub, no peripheral edema and peripheral pulses strong  MS: Evidence of arthritis in hands without RA nodules. No acute findings.   NEURO: Normal strength and tone, mentation intact and speech normal  PSYCH: mentation appears normal, affect flat, judgement and insight intact, and appearance well  groomed    Results for orders placed or performed in visit on 11/20/23   Anti Nuclear Anette IgG by IFA with Reflex     Status: Abnormal   Result Value Ref Range    ROMERO interpretation Positive (A) Negative    ROMERO pattern 1 Nucleolar     ROMERO titer 1 1:320    Rheumatoid factor     Status: Normal   Result Value Ref Range    Rheumatoid Factor <10 <14 IU/mL   CRP, inflammation     Status: Normal   Result Value Ref Range    CRP Inflammation <3.00 <5.00 mg/L   Extra Tube     Status: None    Narrative    The following orders were created for panel order Extra Tube.  Procedure                               Abnormality         Status                     ---------                               -----------         ------                     Extra Purple Top Tube[[id number]]                            Final result                 Please view results for these tests on the individual orders.   Extra Purple Top Tube     Status: None   Result Value Ref Range    Hold Specimen [facility]

## 2023-11-21 LAB
ANA PAT SER IF-IMP: ABNORMAL
ANA SER QL IF: POSITIVE
ANA TITR SER IF: ABNORMAL {TITER}

## 2023-11-21 RX ORDER — LISINOPRIL 10 MG/1
10 TABLET ORAL DAILY
Qty: 90 TABLET | OUTPATIENT
Start: 2023-11-21

## 2023-11-28 DIAGNOSIS — R76.8 ANA POSITIVE: Primary | ICD-10-CM

## 2023-12-04 DIAGNOSIS — F33.1 MAJOR DEPRESSIVE DISORDER, RECURRENT EPISODE, MODERATE (H): ICD-10-CM

## 2023-12-04 RX ORDER — LAMOTRIGINE 200 MG/1
200 TABLET ORAL DAILY
Qty: 28 TABLET | Refills: 0 | Status: SHIPPED | OUTPATIENT
Start: 2023-12-04 | End: 2024-01-05

## 2023-12-04 NOTE — TELEPHONE ENCOUNTER
Lamotrigine      Last Written Prescription Date:  9/11/23  Last Fill Quantity: 30,   # refills: 6  Last Office Visit: 11/20/23  Future Office visit:    Next 5 appointments (look out 90 days)      Dec 12, 2023 10:00 AM  (Arrive by 9:45 AM)  SHORT with Gaby Mitchell CNP  [facility] ([facility] ) [address]  [facility] [address]  [phone]

## 2023-12-12 DIAGNOSIS — R76.8 ANA POSITIVE: Primary | ICD-10-CM

## 2023-12-12 NOTE — Clinical Note
Please notify of labs ordered. Labs for rheumatology to consider consult.  Was no show for today. If no concerns, may come in for lab only.

## 2023-12-12 NOTE — PROGRESS NOTES
Fax received from [facility] recommending JOSE panel and centromere prior to completing on boarding triage. Zeenat was a no show for visit today. Will have our HUC/scheduling staff reach for at minimum a lab only.     1. ROMERO positive  - JOSE antibody panel; Future  - Centromere Antibody IgG; Future      Gaby Mitchell, APRN, CNP

## 2024-01-04 DIAGNOSIS — F33.1 MAJOR DEPRESSIVE DISORDER, RECURRENT EPISODE, MODERATE (H): ICD-10-CM

## 2024-01-05 RX ORDER — LAMOTRIGINE 200 MG/1
200 TABLET ORAL DAILY
Qty: 28 TABLET | Refills: 5 | Status: SHIPPED | OUTPATIENT
Start: 2024-01-05 | End: 2024-06-20

## 2024-01-05 NOTE — TELEPHONE ENCOUNTER
Lamictal      Last Written Prescription Date:  12/4/23  Last Fill Quantity: 28,   # refills: 0  Last Office Visit: 11/20/23  Future Office visit:       Routing refill request to provider for review/approval because:

## 2024-01-09 ENCOUNTER — NURSE TRIAGE (OUTPATIENT)
Dept: FAMILY MEDICINE | Facility: OTHER | Age: 49
End: 2024-01-09

## 2024-01-09 NOTE — TELEPHONE ENCOUNTER
Nurse Triage SBAR    Is this a 2nd Level Triage? YES, LICENSED PRACTITIONER REVIEW IS REQUIRED    Situation: Patient calling into the clinic with complaints of intermittent irregular heart beat and pain in chest.    Background: Patient reports that she is having palpitations and pain in her chest over the past week that is intermittent.  Describes the pain as pressure and is relieved after coughing.  Patient reports history of PVC's and that PCP is aware.      Assessment: Patient experiencing intermittent pain in chest with irregular heart beat.  Patient has history of irregular heart rhythm.  No dizziness, abnormal sweating, shortness of breath.  Patient requesting to see PCP to be checked.    Protocol Recommended Disposition: Go To ED/UCC Now (Or To Office With PCP Approval), See More Appropriate Protocol      Recommendation: Spoke with Gaby Mitchell CNP- advised to go to ED       Spoke to the PCP, Gaby Mitchell CNP in clinic directly.    Does the patient meet one of the following criteria for ADS visit consideration? No     Reason for Disposition   Chest pain   Chest pain or 'angina' comes and goes and is happening more often (increasing in frequency) or getting worse (increasing in severity) (Exception: Chest pains that last only a few seconds.)    Additional Information   Negative: SEVERE difficulty breathing (e.g., struggling for each breath, speaks in single words)   Negative: Difficult to awaken or acting confused (e.g., disoriented, slurred speech)   Negative: Shock suspected (e.g., cold/pale/clammy skin, too weak to stand, low BP, rapid pulse)   Negative: Passed out (i.e., lost consciousness, collapsed and was not responding)   Negative: Passed out (i.e., lost consciousness, collapsed and was not responding)   Negative: Shock suspected (e.g., cold/pale/clammy skin, too weak to stand, low BP, rapid pulse)   Negative: Difficult to awaken or acting confused (e.g., disoriented, slurred speech)   Negative:  Visible sweat on face or sweat dripping down face   Negative: Unable to walk, or can only walk with assistance (e.g., requires support)   Negative: Received SHOCK from implantable cardiac defibrillator and has persisting symptoms (i.e., palpitations, lightheadedness)   Negative: Dizziness, lightheadedness, or weakness and heart beating very rapidly (e.g., > 140 / minute)   Negative: Dizziness, lightheadedness, or weakness and heart beating very slowly (e.g., < 50 / minute)   Negative: Sounds like a life-threatening emergency to the triager   Negative: Chest pain lasting longer than 5 minutes and ANY of the following:         Pain is crushing, pressure-like, or heavy         Over 44 years old          Over 30 years old and one cardiac risk factor (e.g diabetes, high blood pressure, high cholesterol, smoker, or family history of heart disease)         History of heart disease (e.g. angina, heart attack, heart failure, bypass surgery, takes nitroglycerin)   Negative: Heart beating < 50 beats per minute OR > 140 beats per minute   Negative: Visible sweat on face or sweat dripping down face   Negative: Sounds like a life-threatening emergency to the triager   Negative: Followed an injury to chest   Negative: SEVERE chest pain   Negative: Pain also in shoulder(s) or arm(s) or jaw   Negative: Difficulty breathing   Negative: Cocaine use within last 3 days   Negative: Major surgery in the past month   Negative: Hip or leg fracture (broken bone) in past month (or had cast on leg or ankle in past month)   Negative: Illness requiring prolonged bedrest in past month (e.g., immobilization, long hospital stay)   Negative: Long-distance travel in past month (e.g., car, bus, train, plane; with trip lasting 6 or more hours)   Negative: History of prior 'blood clot' in leg or lungs (i.e., deep vein thrombosis, pulmonary embolism)   Negative: History of inherited increased risk of blood clots (e.g., Factor 5 Leiden, Anti-thrombin 3,  "Protein C or Protein S deficiency, Prothrombin mutation)   Negative: Cancer treatment in the past two months (or has cancer now)   Negative: Heart beating irregularly or very rapidly    Answer Assessment - Initial Assessment Questions  1. DESCRIPTION: \"Please describe your heart rate or heartbeat that you are having\" (e.g., fast/slow, regular/irregular, skipped or extra beats, \"palpitations\")      Intermittent palpitations/irregular  2. ONSET: \"When did it start?\" (Minutes, hours or days)       One week ago  3. DURATION: \"How long does it last\" (e.g., seconds, minutes, hours)      Lasts about 10-15 minutes  4. PATTERN \"Does it come and go, or has it been constant since it started?\"  \"Does it get worse with exertion?\"   \"Are you feeling it now?\"      Intermittent- not currently having, feels like it goes away after cough  5. TAP: \"Using your hand, can you tap out what you are feeling on a chair or table in front of you, so that I can hear?\" (Note: not all patients can do this)        Not currently experiencing  6. HEART RATE: \"Can you tell me your heart rate?\" \"How many beats in 15 seconds?\"  (Note: not all patients can do this)        28 x 4 = 112  7. RECURRENT SYMPTOM: \"Have you ever had this before?\" If Yes, ask: \"When was the last time?\" and \"What happened that time?\"       Has history of irregular heart beat  8. CAUSE: \"What do you think is causing the palpitations?\"      Does not know cause  9. CARDIAC HISTORY: \"Do you have any history of heart disease?\" (e.g., heart attack, angina, bypass surgery, angioplasty, arrhythmia)       arrhythmia  10. OTHER SYMPTOMS: \"Do you have any other symptoms?\" (e.g., dizziness, chest pain, sweating, difficulty breathing)        Sharp pain in chest, clears with a cough.  11. PREGNANCY: \"Is there any chance you are pregnant?\" \"When was your last menstrual period?\"        No- end of last month.    Protocols used: Heart Rate and Heartbeat Iwaqzgkls-A-WK, Chest Pain-A-OH    "

## 2024-01-29 DIAGNOSIS — Z87.898 HISTORY OF SEIZURES: ICD-10-CM

## 2024-01-29 RX ORDER — TOPIRAMATE 50 MG/1
50 TABLET, FILM COATED ORAL 2 TIMES DAILY
Qty: 56 TABLET | Refills: 5 | Status: SHIPPED | OUTPATIENT
Start: 2024-01-29 | End: 2024-07-18

## 2024-01-29 RX ORDER — LEVETIRACETAM 1000 MG/1
TABLET ORAL
Qty: 56 TABLET | Refills: 5 | Status: SHIPPED | OUTPATIENT
Start: 2024-01-29 | End: 2024-07-18

## 2024-01-29 NOTE — TELEPHONE ENCOUNTER
Keppra      Last Written Prescription Date:  11/3/23  Last Fill Quantity: 180,   # refills: 0  Last Office Visit: 11/20/23  Future Office visit:       Routing refill request to provider for review/approval because:      Topamax      Last Written Prescription Date:  11/3/23  Last Fill Quantity: 180,   # refills: 0  Last Office Visit: 11/20/23  Future Office visit:       Routing refill request to provider for review/approval because:

## 2024-02-27 ENCOUNTER — OFFICE VISIT (OUTPATIENT)
Dept: FAMILY MEDICINE | Facility: OTHER | Age: 49
End: 2024-02-27
Attending: NURSE PRACTITIONER
Payer: MEDICAID

## 2024-02-27 VITALS
HEART RATE: 81 BPM | SYSTOLIC BLOOD PRESSURE: 126 MMHG | WEIGHT: 235.1 LBS | TEMPERATURE: 96.8 F | OXYGEN SATURATION: 98 % | HEIGHT: 66 IN | BODY MASS INDEX: 37.78 KG/M2 | DIASTOLIC BLOOD PRESSURE: 78 MMHG | RESPIRATION RATE: 16 BRPM

## 2024-02-27 DIAGNOSIS — Z87.898 HISTORY OF SEIZURES: ICD-10-CM

## 2024-02-27 DIAGNOSIS — R56.9 CONVULSIONS, UNSPECIFIED CONVULSION TYPE (H): ICD-10-CM

## 2024-02-27 DIAGNOSIS — H60.332 ACUTE SWIMMER'S EAR OF LEFT SIDE: ICD-10-CM

## 2024-02-27 DIAGNOSIS — H91.93 BILATERAL HEARING LOSS, UNSPECIFIED HEARING LOSS TYPE: ICD-10-CM

## 2024-02-27 DIAGNOSIS — H66.012 NON-RECURRENT ACUTE SUPPURATIVE OTITIS MEDIA OF LEFT EAR WITH SPONTANEOUS RUPTURE OF TYMPANIC MEMBRANE: Primary | ICD-10-CM

## 2024-02-27 PROCEDURE — G0463 HOSPITAL OUTPT CLINIC VISIT: HCPCS

## 2024-02-27 PROCEDURE — 99213 OFFICE O/P EST LOW 20 MIN: CPT | Performed by: NURSE PRACTITIONER

## 2024-02-27 RX ORDER — OFLOXACIN 3 MG/ML
10 SOLUTION AURICULAR (OTIC) DAILY
COMMUNITY
Start: 2024-02-26

## 2024-02-27 RX ORDER — DOXYCYCLINE 100 MG/1
100 CAPSULE ORAL 2 TIMES DAILY
COMMUNITY
Start: 2024-02-26

## 2024-02-27 ASSESSMENT — ANXIETY QUESTIONNAIRES
6. BECOMING EASILY ANNOYED OR IRRITABLE: NOT AT ALL
GAD7 TOTAL SCORE: 6
3. WORRYING TOO MUCH ABOUT DIFFERENT THINGS: SEVERAL DAYS
5. BEING SO RESTLESS THAT IT IS HARD TO SIT STILL: SEVERAL DAYS
8. IF YOU CHECKED OFF ANY PROBLEMS, HOW DIFFICULT HAVE THESE MADE IT FOR YOU TO DO YOUR WORK, TAKE CARE OF THINGS AT HOME, OR GET ALONG WITH OTHER PEOPLE?: SOMEWHAT DIFFICULT
IF YOU CHECKED OFF ANY PROBLEMS ON THIS QUESTIONNAIRE, HOW DIFFICULT HAVE THESE PROBLEMS MADE IT FOR YOU TO DO YOUR WORK, TAKE CARE OF THINGS AT HOME, OR GET ALONG WITH OTHER PEOPLE: SOMEWHAT DIFFICULT
7. FEELING AFRAID AS IF SOMETHING AWFUL MIGHT HAPPEN: SEVERAL DAYS
4. TROUBLE RELAXING: SEVERAL DAYS
1. FEELING NERVOUS, ANXIOUS, OR ON EDGE: SEVERAL DAYS
2. NOT BEING ABLE TO STOP OR CONTROL WORRYING: SEVERAL DAYS
GAD7 TOTAL SCORE: 6
7. FEELING AFRAID AS IF SOMETHING AWFUL MIGHT HAPPEN: SEVERAL DAYS
GAD7 TOTAL SCORE: 6

## 2024-02-27 ASSESSMENT — PATIENT HEALTH QUESTIONNAIRE - PHQ9
10. IF YOU CHECKED OFF ANY PROBLEMS, HOW DIFFICULT HAVE THESE PROBLEMS MADE IT FOR YOU TO DO YOUR WORK, TAKE CARE OF THINGS AT HOME, OR GET ALONG WITH OTHER PEOPLE: SOMEWHAT DIFFICULT
SUM OF ALL RESPONSES TO PHQ QUESTIONS 1-9: 5
SUM OF ALL RESPONSES TO PHQ QUESTIONS 1-9: 5

## 2024-02-27 ASSESSMENT — PAIN SCALES - GENERAL: PAINLEVEL: MILD PAIN (3)

## 2024-02-27 NOTE — PROGRESS NOTES
Assessment & Plan     Non-recurrent acute suppurative otitis media of left ear with spontaneous rupture of tympanic membrane  - Adult Audiology  Referral; Future  - Adult ENT  Referral; Future    Acute swimmer's ear of left side  - Adult Audiology  Referral; Future  - Adult ENT  Referral; Future    Bilateral hearing loss, unspecified hearing loss type  Chronic- we will update audiology and have her follow up with ENT.  - Adult Audiology  Referral; Future  - Adult ENT  Referral; Future    Convulsions, unspecified convulsion type (H)  Has not been seen by neurology for many years. 2016 last on file. Much overdue. Zeenat is accepting of a referral. Currently taking and tolerating medications per report.    - Adult Neurology  Referral    History of seizures  - Adult Neurology  Referral        Prescription drug management  No LOS data to display   Time spent by me doing chart review, history and exam, documentation and further activities per the note    MED REC REQUIRED  Post Medication Reconciliation Status: patient was not discharged from an inpatient facility or TCU  Nicotine/Tobacco Cessation  She reports that she has been smoking cigarettes. She started smoking about 36 years ago. She has a 30 pack-year smoking history. She has never used smokeless tobacco.  Nicotine/Tobacco Cessation Plan  Information offered: Patient not interested at this time      No follow-ups on file.    Subjective   Zeenat is a 48 year old, presenting for the following health issues:  Ear Problem and ER F/U        2/27/2024     8:22 AM   Additional Questions   Roomed by adriana   Accompanied by none     HPI     ED/UC Followup:    Facility:  [facility]   Date of visit: 2/26/24  Reason for visit: Cough ear pain   Current Status: can't hear out of left ear was prescribed Recently treated in  at [facility] Doxycycline 100 mg bid and Floxin drops       Hx if seizures: last  "seizure was about 6 months ago. Taking keppra and tolerating well. Topiramate also ordered (longstanding). She is not sure if she takes this but reports she is still getting the blister packs from the pharmacy and takes everything that is ordered.    HTN: taking lisinopril 10 mg BID. Reports taking and tolerating well.     Dysthymia- Takes and tolerated Fetzima. This was originally started by Job and continued do to difficulty with transportation.       Answers submitted by the patient for this visit:  Patient Health Questionnaire (Submitted on 2/27/2024)  If you checked off any problems, how difficult have these problems made it for you to do your work, take care of things at home, or get along with other people?: Somewhat difficult  PHQ9 TOTAL SCORE: 5  JOHN-7 (Submitted on 2/27/2024)  JOHN 7 TOTAL SCORE: 6        Objective    /78 (BP Location: Right arm, Patient Position: Chair, Cuff Size: Adult Large)   Pulse 81   Temp 96.8  F (36  C) (Tympanic)   Resp 16   Ht 1.676 m (5' 6\")   Wt 106.6 kg (235 lb 1.6 oz)   LMP 02/20/2024 (Approximate)   SpO2 98%   BMI 37.95 kg/m    Body mass index is 37.95 kg/m .      Physical Exam   GENERAL: alert and no distress  EYES: Eyes grossly normal to inspection, PERRL and conjunctivae and sclerae normal  HENT: normal cephalic/atraumatic, right ear: normal: no effusions, no erythema, normal landmarks, left ear: mucopurulent effusion, perforation of TM, and purulent drainage in canal. Canal boggy and reddened.  Nose and mouth without ulcers or lesions, oropharynx clear, and oral mucous membranes moist  NECK: no adenopathy, no asymmetry, masses, or scars  RESP: lungs clear to auscultation - no rales, rhonchi or wheezes  CV: regular rate and rhythm, normal S1 S2, no S3 or S4, no murmur, click or rub, no peripheral edema   NEURO: Normal strength and tone, mentation intact and speech normal            Signed Electronically by: Gaby Mitchell, CNP    "

## 2024-02-27 NOTE — COMMUNITY RESOURCES LIST (ENGLISH)
02/27/2024   [facility]  N/A  For questions about this resource list or additional care needs, please contact your primary care clinic or care manager.  Phone: [phone]   Email: N/A   Address: [address] [place] [address]   Hours: N/A        Food and Nutrition       Food pantry  1  [facility] [employer] [facility] Free Pantry Distance: 0.77 miles      Pickup   [address] [place] [address]  Language: English  Hours: Mon - Sun Open 24 Hours  Fees: Free   Phone: (569) 705-5553 Email: contactus@[employer].org Website: http://www.[employer].org     2  [facility] and [facility] [place] Distance: 19.39 miles      Pickup   [address] [place] [address]  Language: English  Hours: Mon 9:00 AM - 11:00 AM , Tue 1:00 PM - 3:00 PM , Wed - Thu 9:00 AM - 11:00 AM  Fees: Free, Self Pay   Phone: (292) 232-1394 Email: ean@[email or web address].org Website: https://[facility].org/northern/[place]     SNAP application assistance  3  [facility] [employer] & [facility] Distance: 0.75 miles      In-Person, Phone/Virtual   [address] [place] [address]  Language: English  Hours: Mon - Fri 8:00 AM - 4:30 PM  Fees: Free   Phone: (262) 914-4985 Email: financialassistance@[facility] Website: https://www.[facility].gov/ymlycusxcve-u-s/public-health-human-services/economic-services-supports     [address] [place] [address] Economic Services & [facility] Distance: 18.55 miles      In-Person, Phone/Virtual   [address] [place] [address]  Language: English  Hours: Mon - Fri 8:00 AM - 4:30 PM  Fees: Free   Phone: (956) 916-9604 Website: https://www.[facility].gov/tehtywwllhg-k-y/public-health-human-services/economic-services-supports     Soup kitchen or free meals  5  [facility] [place] [facility] Distance: 19.39 miles      Pickup   [address] [place] [address]  Language: English  Hours: Mon - Fri 4:00 PM - 4:45 PM  Fees: Free   Phone: (505) 313-5088 Email: ean@[facility] Website: https://[facility].org/northern/[place]          Transportation       Free or low-cost transportation  6  [facility] [employer] - [facility] - [employer] - Free or Low-cost Transportation Distance: 19.27 miles      In-Person   [address] [place] [address]  Language: English  Hours: Mon - Fri 8:00 AM - 4:30 PM  Fees: Free   Phone: (685) 314-1220 Email: contactus@[employer] Website: https://www.Dayjetorg/component/mymaplocations/dxklwoc-umuhauesotk-mjpzqq          Important Numbers & Websites       Emergency Services   [address]  [place] [address]  Poison Control   (934) 787-5958  Suicide Prevention Lifeline   (575) 427-5139 (TALK)  Child Abuse Hotline   (445) 397-4600 (4-A-Child)  Sexual Assault Hotline   (574) 224-4510 (HOPE)  National Runaway Safeline   (821) 911-6694 (RUNAWAY)  All-Options Talkline   (274) 186-2373  Substance Abuse Referral   (590) 851-1423 (HELP)

## 2024-04-19 DIAGNOSIS — Z00.00 HEALTHCARE MAINTENANCE: Primary | ICD-10-CM

## 2024-04-19 NOTE — TELEPHONE ENCOUNTER
Multivitamin  Last Written Prescription Date: 5/16/23  Last Fill Quantity: 28 # of Refills: 11  Last Office Visit: 2/27/24

## 2024-04-23 RX ORDER — MULTIVITAMIN WITH FOLIC ACID 400 MCG
1 TABLET ORAL DAILY
Qty: 28 TABLET | Refills: 0 | Status: SHIPPED | OUTPATIENT
Start: 2024-04-23 | End: 2024-05-20

## 2024-05-18 DIAGNOSIS — Z00.00 HEALTHCARE MAINTENANCE: ICD-10-CM

## 2024-05-20 RX ORDER — MULTIVITAMIN WITH FOLIC ACID 400 MCG
1 TABLET ORAL DAILY
Qty: 28 TABLET | Refills: 11 | Status: SHIPPED | OUTPATIENT
Start: 2024-05-20

## 2024-05-20 NOTE — TELEPHONE ENCOUNTER
TAB-A-RAJESH TABLET         Last Written Prescription Date:  4/23/24  Last Fill Quantity: 28,   # refills: 0  Last Office Visit: 2/27/24  Future Office visit:    Next 5 appointments (look out 90 days)      May 28, 2024  8:30 AM  (Arrive by 8:15 AM)  SHORT with Gaby Mitchell CNP  [facility] ([facility] ) [address]  [facility] [address]  [phone]             Routing refill request to provider for review/approval because:  Vitamin Supplements Protocol Zywkbi8005/18/2024 09:46 AM   Protocol Details Medication indicated for associated diagnosis

## 2024-05-27 PROBLEM — R87.613 HSIL (HIGH GRADE SQUAMOUS INTRAEPITHELIAL LESION) ON PAP SMEAR OF CERVIX: Status: ACTIVE | Noted: 2018-01-31

## 2024-05-27 PROBLEM — N87.0 CIN I (CERVICAL INTRAEPITHELIAL NEOPLASIA I): Status: RESOLVED | Noted: 2018-05-08 | Resolved: 2024-05-27

## 2024-06-19 DIAGNOSIS — F33.1 MAJOR DEPRESSIVE DISORDER, RECURRENT EPISODE, MODERATE (H): ICD-10-CM

## 2024-06-20 RX ORDER — LAMOTRIGINE 200 MG/1
200 TABLET ORAL DAILY
Qty: 28 TABLET | Refills: 4 | Status: SHIPPED | OUTPATIENT
Start: 2024-06-20

## 2024-06-20 NOTE — TELEPHONE ENCOUNTER
LAMOTRIGINE 200 MG TABLET         Last Written Prescription Date:  1/5/24  Last Fill Quantity: 28,   # refills: 5  Last Office Visit: 5/28/24  Future Office visit:       Routing refill request to provider for review/approval because:    Anti-Seizure Meds Protocol  Wrdlja7406/19/2024 02:45 PM   Protocol Details PHQ-9 score less than 5 in past 6 months.    Review Authorizing provider's last note.         9/11/2023     1:07 PM 11/20/2023     8:44 AM 2/27/2024     8:21 AM   PHQ   PHQ-9 Total Score 8 4 5   Q9: Thoughts of better off dead/self-harm past 2 weeks Not at all Not at all Not at all

## 2024-07-03 NOTE — TELEPHONE ENCOUNTER
Instructions      Return in about 2 months (around 8/6/2024).  AMB After Visit Summary (Automatic SnapShot taken 7/1/2024)    Abnormal weight gain:   She wants to discuss weight loss medication.  Advise her to check with the insurance company to see whether injectables are covered, as we are not able to try Phentermine with the elevated blood pressure readings at this time.       Patient started Wegovy 0.25mg weekly 06/12/2024  Was only able to inject 3x as she had malfunction on one injection       She has follow up in August 8th        Pt is requesting more PCA hours due to her knees.  She needs help with cleaning and rides.  Needs a letter requesting more hours would like more then the 21 she currently gets.  She would like to have 30 hours a week.

## 2024-07-13 NOTE — PROGRESS NOTES
Clinic Care Coordination Contact  Care Team Conversations    CC contacted pt and dtr to remind of dental apt tomorrow at 8:00 AM at the [facility] in [place]. No answer LVM with requests to call back.     Nedra LEON RN, Care Coordinator  [facility]  [phone] Option 1         13-Jul-2024 18:03:29

## 2024-07-17 DIAGNOSIS — Z87.898 HISTORY OF SEIZURES: ICD-10-CM

## 2024-07-17 NOTE — TELEPHONE ENCOUNTER
TOPIRAMATE 50 MG TABLET         Last Written Prescription Date:  1/19/24  Last Fill Quantity: 56,   # refills: 5  Last Office Visit: 5/28/24  Future Office visit:       Routing refill request to provider for review/approval because:  Anti-Seizure Meds Protocol  Syddbe2607/17/2024 09:49 AM   Protocol Details Review Authorizing provider's last note.    Medication indicated for associated diagnosis       LEVETIRACETAM 1,000 MG TABLET         Last Written Prescription Date:  1/19/24  Last Fill Quantity: 56,   # refills: 5  Last Office Visit: 5/28/24  Future Office visit:       Routing refill request to provider for review/approval because:  Drug not on the [facility] or [facility] refill protocol or controlled substance

## 2024-07-17 NOTE — TELEPHONE ENCOUNTER
Keppra      Last Written Prescription Date:  1/29/24  Last Fill Quantity: 56,   # refills: 5  Last Office Visit: 2/27/24  Future Office visit:       Routing refill request to provider for review/approval because:      Topamax      Last Written Prescription Date:  1/29/24  Last Fill Quantity: 56,   # refills: 5  Last Office Visit: 2/27/24  Future Office visit:       Routing refill request to provider for review/approval because:

## 2024-07-18 RX ORDER — LEVETIRACETAM 1000 MG/1
TABLET ORAL
Qty: 56 TABLET | Refills: 1 | Status: SHIPPED | OUTPATIENT
Start: 2024-07-18 | End: 2024-09-09

## 2024-07-18 RX ORDER — TOPIRAMATE 50 MG/1
50 TABLET, FILM COATED ORAL 2 TIMES DAILY
Qty: 56 TABLET | Refills: 1 | Status: SHIPPED | OUTPATIENT
Start: 2024-07-18 | End: 2024-09-09

## 2024-09-09 DIAGNOSIS — Z87.898 HISTORY OF SEIZURES: ICD-10-CM

## 2024-09-09 RX ORDER — LEVETIRACETAM 1000 MG/1
TABLET ORAL
Qty: 56 TABLET | Refills: 0 | Status: SHIPPED | OUTPATIENT
Start: 2024-09-09

## 2024-09-09 RX ORDER — TOPIRAMATE 50 MG/1
50 TABLET, FILM COATED ORAL 2 TIMES DAILY
Qty: 56 TABLET | Refills: 0 | Status: SHIPPED | OUTPATIENT
Start: 2024-09-09

## 2024-09-09 NOTE — TELEPHONE ENCOUNTER
TOPIRAMATE 50 MG TABLET         Last Written Prescription Date:  7/18/24  Last Fill Quantity: 56,   # refills: 1  Last Office Visit: 5/28/24  Future Office visit:       Routing refill request to provider for review/approval because:    Anti-Seizure Meds Protocol  Rccswf1809/09/2024 12:36 PM   Protocol Details Review Authorizing provider's last note.    Medication indicated for associated diagnosis       LEVETIRACETAM 1,000 MG TABLET         Last Written Prescription Date:  7/18/24  Last Fill Quantity: 56,   # refills: 1  Last Office Visit: 5/28/24  Future Office visit:       Routing refill request to provider for review/approval because:  Drug not on the [facility] or [facility] refill protocol or controlled substance

## 2024-10-08 DIAGNOSIS — Z87.898 HISTORY OF SEIZURES: ICD-10-CM

## 2024-10-09 NOTE — TELEPHONE ENCOUNTER
Topamax      Last Written Prescription Date:  9/9/24  Last Fill Quantity: 56,   # refills: 0  Last Office Visit: 5/28/24  Future Office visit:       Routing refill request to provider for review/approval because:      Keppra      Last Written Prescription Date:  9/9/24  Last Fill Quantity: 56,   # refills: 0  Last Office Visit: 5/28/24  Future Office visit:       Routing refill request to provider for review/approval because:

## 2024-10-09 NOTE — TELEPHONE ENCOUNTER
TOPIRAMATE 50 MG TABLET       Routing refill request to provider for review/approval because:  Anti-Seizure Meds Protocol  Ogofpf60/08/2024 05:16 PM   Protocol Details Review Authorizing provider's last note.    Has GFR on file in past 12 months and most recent value is normal    Medication indicated for associated diagnosis     GFR Estimate   Date Value Ref Range Status   09/11/2023 >90 >60 mL/min/1.73m2 Final   08/13/2020 >90 >60 mL/min/[1.73_m2] Final     Comment:     Non  GFR Calc  Starting 12/18/2018, serum creatinine based estimated GFR (eGFR) will be   calculated using the Chronic Kidney Disease Epidemiology Collaboration   (CKD-EPI) equation.            LEVETIRACETAM 1,000 MG TABLET       Routing refill request to provider for review/approval because:  Drug not on the G, P or [facility] refill protocol or controlled substance

## 2024-10-10 RX ORDER — TOPIRAMATE 50 MG/1
50 TABLET, FILM COATED ORAL 2 TIMES DAILY
Qty: 56 TABLET | Refills: 0 | Status: SHIPPED | OUTPATIENT
Start: 2024-10-10 | End: 2024-11-05

## 2024-10-10 RX ORDER — LEVETIRACETAM 1000 MG/1
TABLET ORAL
Qty: 56 TABLET | Refills: 0 | Status: SHIPPED | OUTPATIENT
Start: 2024-10-10 | End: 2024-11-04

## 2024-11-01 NOTE — PROGRESS NOTES
Assessment & Plan     History of seizures  Refill of medications. Reviewed the need to establish with neurology for management of seizures.  Zeenat verbalizes understanding.   - Adult Neurology  Referral  - levETIRAcetam (KEPPRA) 1000 MG tablet; TAKE 1 TABLET BY MOUTH TWICE DAILY. DO NOT CRUSH.  - Keppra (Levetiracetam) Level; Future  - Keppra (Levetiracetam) Level    Subacute cough  Reports cough has been worse over past few months. Chronic smoker. Advised on cessation but not ready to quit yet. Will move forward with a chest xray to rule out lung cancer. Discussed that in future when she meets requirements, I highly recommend routine lung cancer screening. Not yet old enough to meet criteria. Zeenat expresses understanding but will decide later.   - CT Chest w Contrast; Future    Benign essential hypertension  Continue lisinopril as ordered.   - BASIC METABOLIC PANEL; Future  - lisinopril (ZESTRIL) 10 MG tablet; Take 1 tablet (10 mg) by mouth daily.    Morbid obesity, unspecified obesity type (H)  Reviewed nutrition and exercise recommendations.   - Lipid Profile (Chol, Trig, HDL, LDL calc); Future  - Comprehensive metabolic panel (BMP + Alb, Alk Phos, ALT, AST, Total. Bili, TP); Future  - CBC with platelets; Future  - TSH with free T4 reflex; Future  - Lipid Profile (Chol, Trig, HDL, LDL calc)  - Comprehensive metabolic panel (BMP + Alb, Alk Phos, ALT, AST, Total. Bili, TP)  - CBC with platelets  - TSH with free T4 reflex    Tobacco use disorder  Zeenat aware of recommendation to quit smoking. Not yet ready. If and when ready she agrees to reach out if needing help with this.     Atypical chest pain  Most consistent with costochondritis given reproducible nature when palpated.  Education provided that this is typically self limiting and will go away on it's own. May consider ibuprofen for inflammation over the counter. CT ordered for subacute cough as well. We will contact Zeenat with any abnormal results.  "  - EKG 12-lead complete w/read - (Clinic Performed)  - XR CHEST 2 VW (Clinic Performed); Future  - Comprehensive metabolic panel (BMP + Alb, Alk Phos, ALT, AST, Total. Bili, TP); Future  - CBC with platelets; Future  - TSH with free T4 reflex; Future  - Comprehensive metabolic panel (BMP + Alb, Alk Phos, ALT, AST, Total. Bili, TP)  - CBC with platelets  - TSH with free T4 reflex  - CT Chest w Contrast; Future    Major depressive disorder, recurrent episode, moderate (H)  Stable.  Referral to [facility].   - Adult Mental Health  Referral; Future  - Keppra (Levetiracetam) Level; Future  - Keppra (Levetiracetam) Level    Prediabetes  - Hemoglobin A1c; Future  - Hemoglobin A1c    Visit for screening mammogram  - MA Screening Bilateral w/ James; Future    Screen for colon cancer  - Colonoscopy Screening  Referral; Future    Severe protein-calorie malnutrition (H)  Encouraged increased protein in diet. Declines protein shakes.          BMI  Estimated body mass index is 40.56 kg/m  as calculated from the following:    Height as of this encounter: 1.676 m (5' 6\").    Weight as of this encounter: 114 kg (251 lb 4.8 oz).       No follow-ups on file.    David Epperson is a 49 year old, presenting for the following health issues:  Hypertension and Depression  Last seen by me 2/27/24 with no show in May. Transportation can be an issue at times in past. Reports less so an issue currently.       HPI       Sharp chest pain:  Started over a month ago. Points to sternum and left of sternum. Reports it hurts to push on. Has not found any lumps or bumps within breast.     Denies pain radiating into arm or jaw. Does report chronic upper back pain between shoulder blades at times. No pain at the moment.     \"Smokers cough for ago 6 months\" smoked since age of 12. Currently at 2 ppd. Average over lifetime about 1.5 ppd.     Seizure Disorder  Last known seizure: 2 months ago. Reports it was witnessed by " daughter. Reports bumping her head and scrapping right elbow (healed). Reports it lasted about 5 minutes and seemed to daughter to be two back to back.   Medications: Keppra 1000 mg twice a day. Tpiramate 50mg twice a day.   Taking and tolerating medications: yes.  Has been non-compliant in past with medications. Zeenat reports taking all medications for seizure as ordered.     Multiple referrals to neurology have been placed. Has not yet gone. Barrier in past was finding transportation. Reports they now have rides for medical care. Accepting of referral to [facility]. Verbalizes understanding that they need to establish with neurology for their seizures.     Mental Health- Has a  who Zeenat reports is planning to help get her her back to [facility] in [place], [place].         Hypertension Follow-up  Do you check your blood pressure regularly outside of the clinic? No   Are you following a low salt diet? Yes  Are your blood pressures ever more than 140 on the top number (systolic) OR more   than 90 on the bottom number (diastolic), for example 140/90? No    Depression and Anxiety     Current Mental Health Specialists: none.   Mental health worker is getting her an appointment to [facility] in [place] to re-establish care. Reports no referral is needed. I will put one in to help speed up this proces.   How are you doing with your depression since your last visit? No change  How are you doing with your anxiety since your last visit?  No change  Are you having other symptoms that might be associated with depression or anxiety? No  Have you had a significant life event? No   Do you have any concerns with your use of alcohol or other drugs? No    Prediabetes  Follow-up     Do you have any of these symptoms? (Select all that apply)  Blurry vision and Weight gain      BP Readings from Last 2 Encounters:   11/04/24 138/86   02/27/24 126/78     Hemoglobin A1C (%)   Date Value   05/08/2023 5.7 (H)   02/15/2022 5.6    09/28/2014 5.8     LDL Cholesterol Calculated (mg/dL)   Date Value   08/13/2020 80         Social History     Tobacco Use    Smoking status: Every Day     Current packs/day: 1.00     Average packs/day: 1 pack/day for 36.8 years (36.8 ttl pk-yrs)     Types: Cigarettes     Start date: 1988    Smokeless tobacco: Never    Tobacco comments:     has gum   Vaping Use    Vaping status: Former   Substance Use Topics    Alcohol use: Not Currently     Comment: sober since December 2023    Drug use: Yes     Types: Marijuana         11/20/2023     8:44 AM 2/27/2024     8:21 AM 11/4/2024     9:58 AM   PHQ   PHQ-9 Total Score 4 5 8    Q9: Thoughts of better off dead/self-harm past 2 weeks Not at all  Not at all  Not at all        Patient-reported         11/20/2023     8:45 AM 2/27/2024     8:21 AM 11/4/2024     9:59 AM   JOHN-7 SCORE   Total Score 7 (mild anxiety) 6 (mild anxiety) 7 (mild anxiety)   Total Score 7 6 7        Patient-reported         11/4/2024     9:58 AM   Last PHQ-9   1.  Little interest or pleasure in doing things 1    2.  Feeling down, depressed, or hopeless 1    3.  Trouble falling or staying asleep, or sleeping too much 2    4.  Feeling tired or having little energy 1    5.  Poor appetite or overeating 1    6.  Feeling bad about yourself 0    7.  Trouble concentrating 1    8.  Moving slowly or restless 1    Q9: Thoughts of better off dead/self-harm past 2 weeks 0    PHQ-9 Total Score 8        Patient-reported         11/4/2024     9:59 AM   JOHN-7    1. Feeling nervous, anxious, or on edge 1    2. Not being able to stop or control worrying 1    3. Worrying too much about different things 1    4. Trouble relaxing 1    5. Being so restless that it is hard to sit still 1    6. Becoming easily annoyed or irritable 1    7. Feeling afraid, as if something awful might happen 1    JOHN-7 Total Score 7    If you checked any problems, how difficult have they made it for you to do your work, take care of things at home,  "or get along with other people? Somewhat difficult        Patient-reported                     Objective    /86 (BP Location: Left arm, Patient Position: Sitting, Cuff Size: Adult Large)   Pulse 92   Temp 97.3  F (36.3  C) (Tympanic)   Resp 18   Ht 1.676 m (5' 6\")   Wt 114 kg (251 lb 4.8 oz)   LMP 10/29/2024 (Approximate)   SpO2 98%   BMI 40.56 kg/m    Body mass index is 40.56 kg/m .  Physical Exam   GENERAL: alert and no distress  EYES: Eyes grossly normal to inspection, PERRL and conjunctivae and sclerae normal  HENT: ear canals and TM's normal, nose and mouth without ulcers or lesions  NECK: no adenopathy, no asymmetry, masses, or scars  RESP: lungs clear to auscultation - no rales, rhonchi or wheezes  CV: regular rate and rhythm, normal S1 S2, no S3 or S4, no murmur, click or rub, no peripheral edema   ABDOMEN: soft, nontender, no hepatosplenomegaly, no masses and bowel sounds normal  NEURO: Normal strength and tone, sensory exam grossly normal, and mentation intact  PSYCH: mentation appears normal, affect flat, judgement and insight intact, and appearance well groomed    Results for orders placed or performed in visit on 11/04/24   XR CHEST 2 VW (Clinic Performed)     Status: None    Narrative    PROCEDURE:  XR CHEST 2 VIEWS    HISTORY:  Atypical chest pain.     COMPARISON:  None.    FINDINGS:   The cardiac silhouette is normal in size. The pulmonary vasculature is  normal.  The lungs are clear. No pleural effusion or pneumothorax.      Impression    IMPRESSION:  No acute cardiopulmonary disease.      MARÍA ELENA KUMAR MD         SYSTEM ID:  [id number]   Results for orders placed or performed in visit on 11/04/24   Hemoglobin A1c     Status: Abnormal   Result Value Ref Range    Estimated Average Glucose 137 (H) <117 mg/dL    Hemoglobin A1C 6.4 (H) <5.7 %   Lipid Profile (Chol, Trig, HDL, LDL calc)     Status: Abnormal   Result Value Ref Range    Cholesterol 148 <200 mg/dL    Triglycerides 179 (H) " <150 mg/dL    Direct Measure HDL 41 (L) >=50 mg/dL    LDL Cholesterol Calculated 71 <100 mg/dL    Non HDL Cholesterol 107 <130 mg/dL    Patient Fasting > 8hrs? Yes     Narrative    Cholesterol  Desirable: < 200 mg/dL  Borderline High: 200 - 239 mg/dL  High: >= 240 mg/dL    Triglycerides  Normal: < 150 mg/dL  Borderline High: 150 - 199 mg/dL  High: 200-499 mg/dL  Very High: >= 500 mg/dL    Direct Measure HDL  Female: >= 50 mg/dL   Male: >= 40 mg/dL    LDL Cholesterol  Desirable: < 100 mg/dL  Above Desirable: 100 - 129 mg/dL   Borderline High: 130 - 159 mg/dL   High:  160 - 189 mg/dL   Very High: >= 190 mg/dL    Non HDL Cholesterol  Desirable: < 130 mg/dL  Above Desirable: 130 - 159 mg/dL  Borderline High: 160 - 189 mg/dL  High: 190 - 219 mg/dL  Very High: >= 220 mg/dL   Comprehensive metabolic panel (BMP + Alb, Alk Phos, ALT, AST, Total. Bili, TP)     Status: Abnormal   Result Value Ref Range    Sodium 137 135 - 145 mmol/L    Potassium 3.8 3.4 - 5.3 mmol/L    Carbon Dioxide (CO2) 20 (L) 22 - 29 mmol/L    Anion Gap 11 7 - 15 mmol/L    Urea Nitrogen 7.1 6.0 - 20.0 mg/dL    Creatinine 0.64 0.51 - 0.95 mg/dL    GFR Estimate >90 >60 mL/min/1.73m2    Calcium 8.5 (L) 8.8 - 10.4 mg/dL    Chloride 106 98 - 107 mmol/L    Glucose 104 (H) 70 - 99 mg/dL    Alkaline Phosphatase 92 40 - 150 U/L    AST 28 0 - 45 U/L    ALT 26 0 - 50 U/L    Protein Total 6.4 6.4 - 8.3 g/dL    Albumin 4.0 3.5 - 5.2 g/dL    Bilirubin Total 0.3 <=1.2 mg/dL    Patient Fasting > 8hrs? Yes    CBC with platelets     Status: Normal   Result Value Ref Range    WBC Count 7.3 4.0 - 11.0 10e3/uL    RBC Count 4.77 3.80 - 5.20 10e6/uL    Hemoglobin 12.7 11.7 - 15.7 g/dL    Hematocrit 39.0 35.0 - 47.0 %    MCV 82 78 - 100 fL    MCH 26.6 26.5 - 33.0 pg    MCHC 32.6 31.5 - 36.5 g/dL    RDW 14.8 10.0 - 15.0 %    Platelet Count 306 150 - 450 10e3/uL   TSH with free T4 reflex     Status: Normal   Result Value Ref Range    TSH 1.35 0.30 - 4.20 uIU/mL   Keppra  (Levetiracetam) Level     Status: Abnormal   Result Value Ref Range    Keppra (Levetiracetam) Level <2.0 (L) 10.0 - 40.0  g/mL   Extra Tube     Status: None    Narrative    The following orders were created for panel order Extra Tube.  Procedure                               Abnormality         Status                     ---------                               -----------         ------                     Extra Serum Separator Tu...[[id number]]                      Final result                 Please view results for these tests on the individual orders.   Extra Serum Separator Tube (SST)     Status: None   Result Value Ref Range    Hold Specimen [facility]    EKG 12-lead complete w/read - (Clinic Performed)     Status: None   Result Value Ref Range    Systolic Blood Pressure  mmHg    Diastolic Blood Pressure  mmHg    Ventricular Rate 76 BPM    Atrial Rate 76 BPM    NV Interval 122 ms    QRS Duration 90 ms     ms    QTc 441 ms    P Axis 17 degrees    R AXIS 49 degrees    T Axis 44 degrees    Interpretation ECG       Sinus rhythm  Normal ECG  No previous ECGs available  Confirmed by MD Lashay,  ([id number]) on 11/4/2024 4:22:20 PM               Signed Electronically by: Gaby Mitchell, CNP

## 2024-11-03 PROBLEM — R76.8 ANA POSITIVE: Status: ACTIVE | Noted: 2024-03-12

## 2024-11-04 ENCOUNTER — OFFICE VISIT (OUTPATIENT)
Dept: FAMILY MEDICINE | Facility: OTHER | Age: 49
End: 2024-11-04
Attending: NURSE PRACTITIONER
Payer: MEDICAID

## 2024-11-04 ENCOUNTER — ANCILLARY PROCEDURE (OUTPATIENT)
Dept: GENERAL RADIOLOGY | Facility: OTHER | Age: 49
End: 2024-11-04
Attending: NURSE PRACTITIONER
Payer: MEDICAID

## 2024-11-04 VITALS
RESPIRATION RATE: 18 BRPM | SYSTOLIC BLOOD PRESSURE: 138 MMHG | DIASTOLIC BLOOD PRESSURE: 86 MMHG | TEMPERATURE: 97.3 F | BODY MASS INDEX: 40.39 KG/M2 | OXYGEN SATURATION: 98 % | HEART RATE: 92 BPM | HEIGHT: 66 IN | WEIGHT: 251.3 LBS

## 2024-11-04 DIAGNOSIS — Z12.11 SCREEN FOR COLON CANCER: ICD-10-CM

## 2024-11-04 DIAGNOSIS — R07.89 ATYPICAL CHEST PAIN: ICD-10-CM

## 2024-11-04 DIAGNOSIS — R05.2 SUBACUTE COUGH: ICD-10-CM

## 2024-11-04 DIAGNOSIS — E66.01 MORBID OBESITY, UNSPECIFIED OBESITY TYPE (H): ICD-10-CM

## 2024-11-04 DIAGNOSIS — Z87.898 HISTORY OF SEIZURES: ICD-10-CM

## 2024-11-04 DIAGNOSIS — F33.1 MAJOR DEPRESSIVE DISORDER, RECURRENT EPISODE, MODERATE (H): ICD-10-CM

## 2024-11-04 DIAGNOSIS — E43 SEVERE PROTEIN-CALORIE MALNUTRITION (H): ICD-10-CM

## 2024-11-04 DIAGNOSIS — Z87.898 HISTORY OF SEIZURES: Primary | ICD-10-CM

## 2024-11-04 DIAGNOSIS — I10 BENIGN ESSENTIAL HYPERTENSION: ICD-10-CM

## 2024-11-04 DIAGNOSIS — F17.200 TOBACCO USE DISORDER: ICD-10-CM

## 2024-11-04 DIAGNOSIS — Z12.31 VISIT FOR SCREENING MAMMOGRAM: ICD-10-CM

## 2024-11-04 DIAGNOSIS — R73.03 PREDIABETES: ICD-10-CM

## 2024-11-04 LAB
ALBUMIN SERPL BCG-MCNC: 4 G/DL (ref 3.5–5.2)
ALP SERPL-CCNC: 92 U/L (ref 40–150)
ALT SERPL W P-5'-P-CCNC: 26 U/L (ref 0–50)
ANION GAP SERPL CALCULATED.3IONS-SCNC: 11 MMOL/L (ref 7–15)
AST SERPL W P-5'-P-CCNC: 28 U/L (ref 0–45)
ATRIAL RATE - MUSE: 76 BPM
BILIRUB SERPL-MCNC: 0.3 MG/DL
BUN SERPL-MCNC: 7.1 MG/DL (ref 6–20)
CALCIUM SERPL-MCNC: 8.5 MG/DL (ref 8.8–10.4)
CHLORIDE SERPL-SCNC: 106 MMOL/L (ref 98–107)
CHOLEST SERPL-MCNC: 148 MG/DL
CREAT SERPL-MCNC: 0.64 MG/DL (ref 0.51–0.95)
DIASTOLIC BLOOD PRESSURE - MUSE: NORMAL MMHG
EGFRCR SERPLBLD CKD-EPI 2021: >90 ML/MIN/1.73M2
ERYTHROCYTE [DISTWIDTH] IN BLOOD BY AUTOMATED COUNT: 14.8 % (ref 10–15)
EST. AVERAGE GLUCOSE BLD GHB EST-MCNC: 137 MG/DL
FASTING STATUS PATIENT QL REPORTED: YES
FASTING STATUS PATIENT QL REPORTED: YES
GLUCOSE SERPL-MCNC: 104 MG/DL (ref 70–99)
HBA1C MFR BLD: 6.4 %
HCO3 SERPL-SCNC: 20 MMOL/L (ref 22–29)
HCT VFR BLD AUTO: 39 % (ref 35–47)
HDLC SERPL-MCNC: 41 MG/DL
HGB BLD-MCNC: 12.7 G/DL (ref 11.7–15.7)
HOLD SPECIMEN: NORMAL
INTERPRETATION ECG - MUSE: NORMAL
LDLC SERPL CALC-MCNC: 71 MG/DL
LEVETIRACETAM SERPL-MCNC: <2 ÂΜG/ML (ref 10–40)
MCH RBC QN AUTO: 26.6 PG (ref 26.5–33)
MCHC RBC AUTO-ENTMCNC: 32.6 G/DL (ref 31.5–36.5)
MCV RBC AUTO: 82 FL (ref 78–100)
NONHDLC SERPL-MCNC: 107 MG/DL
P AXIS - MUSE: 17 DEGREES
PLATELET # BLD AUTO: 306 10E3/UL (ref 150–450)
POTASSIUM SERPL-SCNC: 3.8 MMOL/L (ref 3.4–5.3)
PR INTERVAL - MUSE: 122 MS
PROT SERPL-MCNC: 6.4 G/DL (ref 6.4–8.3)
QRS DURATION - MUSE: 90 MS
QT - MUSE: 392 MS
QTC - MUSE: 441 MS
R AXIS - MUSE: 49 DEGREES
RBC # BLD AUTO: 4.77 10E6/UL (ref 3.8–5.2)
SODIUM SERPL-SCNC: 137 MMOL/L (ref 135–145)
SYSTOLIC BLOOD PRESSURE - MUSE: NORMAL MMHG
T AXIS - MUSE: 44 DEGREES
TRIGL SERPL-MCNC: 179 MG/DL
TSH SERPL DL<=0.005 MIU/L-ACNC: 1.35 UIU/ML (ref 0.3–4.2)
VENTRICULAR RATE- MUSE: 76 BPM
WBC # BLD AUTO: 7.3 10E3/UL (ref 4–11)

## 2024-11-04 PROCEDURE — 84155 ASSAY OF PROTEIN SERUM: CPT | Mod: ZL | Performed by: NURSE PRACTITIONER

## 2024-11-04 PROCEDURE — 36415 COLL VENOUS BLD VENIPUNCTURE: CPT | Mod: ZL | Performed by: NURSE PRACTITIONER

## 2024-11-04 PROCEDURE — 84443 ASSAY THYROID STIM HORMONE: CPT | Mod: ZL | Performed by: NURSE PRACTITIONER

## 2024-11-04 PROCEDURE — G0463 HOSPITAL OUTPT CLINIC VISIT: HCPCS | Mod: 25

## 2024-11-04 PROCEDURE — 83036 HEMOGLOBIN GLYCOSYLATED A1C: CPT | Mod: ZL | Performed by: NURSE PRACTITIONER

## 2024-11-04 PROCEDURE — 85018 HEMOGLOBIN: CPT | Mod: ZL | Performed by: NURSE PRACTITIONER

## 2024-11-04 PROCEDURE — 71046 X-RAY EXAM CHEST 2 VIEWS: CPT | Mod: TC,FY

## 2024-11-04 PROCEDURE — 82465 ASSAY BLD/SERUM CHOLESTEROL: CPT | Mod: ZL | Performed by: NURSE PRACTITIONER

## 2024-11-04 PROCEDURE — 99215 OFFICE O/P EST HI 40 MIN: CPT | Performed by: NURSE PRACTITIONER

## 2024-11-04 PROCEDURE — 80177 DRUG SCRN QUAN LEVETIRACETAM: CPT | Mod: ZL | Performed by: NURSE PRACTITIONER

## 2024-11-04 PROCEDURE — 93010 ELECTROCARDIOGRAM REPORT: CPT | Performed by: INTERNAL MEDICINE

## 2024-11-04 PROCEDURE — 93005 ELECTROCARDIOGRAM TRACING: CPT | Performed by: NURSE PRACTITIONER

## 2024-11-04 PROCEDURE — 82947 ASSAY GLUCOSE BLOOD QUANT: CPT | Mod: ZL | Performed by: NURSE PRACTITIONER

## 2024-11-04 RX ORDER — LEVETIRACETAM 1000 MG/1
TABLET ORAL
Qty: 56 TABLET | Refills: 0 | OUTPATIENT
Start: 2024-11-04

## 2024-11-04 RX ORDER — LEVETIRACETAM 1000 MG/1
TABLET ORAL
Qty: 180 TABLET | Refills: 3 | Status: SHIPPED | OUTPATIENT
Start: 2024-11-04

## 2024-11-04 RX ORDER — LISINOPRIL 10 MG/1
10 TABLET ORAL DAILY
Qty: 30 TABLET | Refills: 11 | Status: SHIPPED | OUTPATIENT
Start: 2024-11-04

## 2024-11-04 ASSESSMENT — PATIENT HEALTH QUESTIONNAIRE - PHQ9
SUM OF ALL RESPONSES TO PHQ QUESTIONS 1-9: 8
SUM OF ALL RESPONSES TO PHQ QUESTIONS 1-9: 8
10. IF YOU CHECKED OFF ANY PROBLEMS, HOW DIFFICULT HAVE THESE PROBLEMS MADE IT FOR YOU TO DO YOUR WORK, TAKE CARE OF THINGS AT HOME, OR GET ALONG WITH OTHER PEOPLE: SOMEWHAT DIFFICULT

## 2024-11-04 ASSESSMENT — ANXIETY QUESTIONNAIRES
6. BECOMING EASILY ANNOYED OR IRRITABLE: SEVERAL DAYS
GAD7 TOTAL SCORE: 7
3. WORRYING TOO MUCH ABOUT DIFFERENT THINGS: SEVERAL DAYS
7. FEELING AFRAID AS IF SOMETHING AWFUL MIGHT HAPPEN: SEVERAL DAYS
IF YOU CHECKED OFF ANY PROBLEMS ON THIS QUESTIONNAIRE, HOW DIFFICULT HAVE THESE PROBLEMS MADE IT FOR YOU TO DO YOUR WORK, TAKE CARE OF THINGS AT HOME, OR GET ALONG WITH OTHER PEOPLE: SOMEWHAT DIFFICULT
4. TROUBLE RELAXING: SEVERAL DAYS
8. IF YOU CHECKED OFF ANY PROBLEMS, HOW DIFFICULT HAVE THESE MADE IT FOR YOU TO DO YOUR WORK, TAKE CARE OF THINGS AT HOME, OR GET ALONG WITH OTHER PEOPLE?: SOMEWHAT DIFFICULT
7. FEELING AFRAID AS IF SOMETHING AWFUL MIGHT HAPPEN: SEVERAL DAYS
2. NOT BEING ABLE TO STOP OR CONTROL WORRYING: SEVERAL DAYS
1. FEELING NERVOUS, ANXIOUS, OR ON EDGE: SEVERAL DAYS
5. BEING SO RESTLESS THAT IT IS HARD TO SIT STILL: SEVERAL DAYS
GAD7 TOTAL SCORE: 7
GAD7 TOTAL SCORE: 7

## 2024-11-04 ASSESSMENT — PAIN SCALES - GENERAL: PAINLEVEL_OUTOF10: NO PAIN (0)

## 2024-11-05 ENCOUNTER — TELEPHONE (OUTPATIENT)
Dept: FAMILY MEDICINE | Facility: OTHER | Age: 49
End: 2024-11-05

## 2024-11-05 RX ORDER — LAMOTRIGINE 200 MG/1
200 TABLET ORAL DAILY
Qty: 30 TABLET | Refills: 5 | Status: SHIPPED | OUTPATIENT
Start: 2024-11-05

## 2024-11-05 RX ORDER — TOPIRAMATE 50 MG/1
50 TABLET, FILM COATED ORAL 2 TIMES DAILY
Qty: 56 TABLET | Refills: 5 | Status: SHIPPED | OUTPATIENT
Start: 2024-11-05

## 2024-11-05 NOTE — TELEPHONE ENCOUNTER
TOPIRAMATE 50 MG TABLET         Last Written Prescription Date:  10/10/24  Last Fill Quantity: 56,   # refills: 0  Last Office Visit: 11/4/24  Future Office visit:       Routing refill request to provider for review/approval because:  Anti-Seizure Meds Protocol  [id number]/04/2024 08:52 AM   Protocol Details PHQ-9 score less than 5 in past 6 months.    Review Authorizing provider's last note.    Has GFR on file in past 12 months and most recent value is normal         11/20/2023     8:44 AM 2/27/2024     8:21 AM 11/4/2024     9:58 AM   PHQ   PHQ-9 Total Score 4 5 8    Q9: Thoughts of better off dead/self-harm past 2 weeks Not at all  Not at all  Not at all        Patient-reported      GFR Estimate   Date Value Ref Range Status   11/04/2024 >90 >60 mL/min/1.73m2 Final     Comment:     eGFR calculated using 2021 CKD-EPI equation.   08/13/2020 >90 >60 mL/min/[1.73_m2] Final     Comment:     Non  GFR Calc  Starting 12/18/2018, serum creatinine based estimated GFR (eGFR) will be   calculated using the Chronic Kidney Disease Epidemiology Collaboration   (CKD-EPI) equation.           LAMOTRIGINE 200 MG TABLET         Last Written Prescription Date:  6/20/24  Last Fill Quantity: 28,   # refills: 4  Last Office Visit: 11/4/24  Future Office visit:       Routing refill request to provider for review/approval because:    Anti-Seizure Meds Protocol  [id number]/04/2024 08:52 AM   Protocol Details PHQ-9 score less than 5 in past 6 months.    Review Authorizing provider's last note.    Has GFR on file in past 12 months and most recent value is normal     See GFR and PHQ above

## 2024-11-05 NOTE — TELEPHONE ENCOUNTER
Screening Questions for the Scheduling of Screening Colonoscopies     (If Colonoscopy is diagnostic, Provider should review the chart before scheduling.)    Are you younger than 50 or older than 80?  Yes, 49 year old    Do you take aspirin or fish oil?  No (if yes, tell patient to stop 1 week prior to Colonoscopy)    Do you take warfarin (Coumadin), clopidogrel (Plavix), apixaban (Eliquis), dabigatram (Pradaxa), rivaroxaban (Xarelto) or any blood thinner? No    Do you use oxygen at home?  No    Do you have kidney disease? No    Are you on dialysis? No    Have you had a stroke or heart attack in the last year? No    Have you had a stent in your heart or any blood vessel in the last year? No    Have you had a transplant of any organ?  No    Have you had a colonoscopy or upper endoscopy (EGD) before?  No         Date of scheduled Colonoscopy: 1/21/25    Provider: Amy Aggarwal [facility]

## 2024-11-22 ENCOUNTER — HOSPITAL ENCOUNTER (OUTPATIENT)
Facility: HOSPITAL | Age: 49
End: 2024-11-22
Attending: STUDENT IN AN ORGANIZED HEALTH CARE EDUCATION/TRAINING PROGRAM | Admitting: STUDENT IN AN ORGANIZED HEALTH CARE EDUCATION/TRAINING PROGRAM
Payer: MEDICAID

## 2025-01-09 NOTE — OR NURSING
Called patient and she states she wants to continue with colonoscopy.  Clinic attempted X3 to assist patient with scheduling a preop.  Aware to  prep at [facility] and that PAT will call her on the 14th next week for screen call.  Transferred to clinic to make a pre op appt.

## 2025-01-14 ENCOUNTER — OFFICE VISIT (OUTPATIENT)
Dept: FAMILY MEDICINE | Facility: OTHER | Age: 50
End: 2025-01-14
Attending: NURSE PRACTITIONER
Payer: MEDICAID

## 2025-01-14 ENCOUNTER — ANESTHESIA EVENT (OUTPATIENT)
Dept: SURGERY | Facility: HOSPITAL | Age: 50
End: 2025-01-14

## 2025-01-14 DIAGNOSIS — Z91.199 NO-SHOW FOR APPOINTMENT: Primary | ICD-10-CM

## 2025-01-14 RX ORDER — ONDANSETRON 4 MG/1
4 TABLET, ORALLY DISINTEGRATING ORAL EVERY 30 MIN PRN
Status: CANCELLED | OUTPATIENT
Start: 2025-01-14

## 2025-01-14 RX ORDER — LIDOCAINE 40 MG/G
CREAM TOPICAL
Status: CANCELLED | OUTPATIENT
Start: 2025-01-14

## 2025-01-14 RX ORDER — NALOXONE HYDROCHLORIDE 0.4 MG/ML
0.1 INJECTION, SOLUTION INTRAMUSCULAR; INTRAVENOUS; SUBCUTANEOUS
Status: CANCELLED | OUTPATIENT
Start: 2025-01-14

## 2025-01-14 RX ORDER — DEXAMETHASONE SODIUM PHOSPHATE 10 MG/ML
4 INJECTION, SOLUTION INTRAMUSCULAR; INTRAVENOUS
Status: CANCELLED | OUTPATIENT
Start: 2025-01-14

## 2025-01-14 RX ORDER — SODIUM CHLORIDE, SODIUM LACTATE, POTASSIUM CHLORIDE, CALCIUM CHLORIDE 600; 310; 30; 20 MG/100ML; MG/100ML; MG/100ML; MG/100ML
INJECTION, SOLUTION INTRAVENOUS CONTINUOUS
Status: CANCELLED | OUTPATIENT
Start: 2025-01-14

## 2025-01-14 RX ORDER — ONDANSETRON 2 MG/ML
4 INJECTION INTRAMUSCULAR; INTRAVENOUS EVERY 30 MIN PRN
Status: CANCELLED | OUTPATIENT
Start: 2025-01-14

## 2025-01-14 ASSESSMENT — ENCOUNTER SYMPTOMS: SEIZURES: 1

## 2025-01-14 ASSESSMENT — LIFESTYLE VARIABLES: TOBACCO_USE: 1
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
## 2025-01-21 ENCOUNTER — ANESTHESIA (OUTPATIENT)
Dept: SURGERY | Facility: HOSPITAL | Age: 50
End: 2025-01-21

## 2025-02-06 ENCOUNTER — OFFICE VISIT (OUTPATIENT)
Dept: FAMILY MEDICINE | Facility: OTHER | Age: 50
End: 2025-02-06
Attending: NURSE PRACTITIONER
Payer: MEDICAID

## 2025-02-06 DIAGNOSIS — Z91.199 NO-SHOW FOR APPOINTMENT: Primary | ICD-10-CM

## 2025-03-11 ENCOUNTER — TELEPHONE (OUTPATIENT)
Dept: SURGERY | Facility: OTHER | Age: 50
End: 2025-03-11

## 2025-03-11 NOTE — CONFIDENTIAL NOTE
March 11, 2025    Patient would like to cancel colonoscopy 3/18 with Dr Reyes. Patient did not wish to reschedule at time of call    -Mirian Myers on 3/11/2025 at 11:10 AM

## 2025-03-12 RX ORDER — ONDANSETRON 4 MG/1
4 TABLET, ORALLY DISINTEGRATING ORAL EVERY 30 MIN PRN
Status: CANCELLED | OUTPATIENT
Start: 2025-03-12

## 2025-03-12 RX ORDER — SODIUM CHLORIDE, SODIUM LACTATE, POTASSIUM CHLORIDE, CALCIUM CHLORIDE 600; 310; 30; 20 MG/100ML; MG/100ML; MG/100ML; MG/100ML
INJECTION, SOLUTION INTRAVENOUS CONTINUOUS
Status: CANCELLED | OUTPATIENT
Start: 2025-03-12

## 2025-03-12 RX ORDER — DEXAMETHASONE SODIUM PHOSPHATE 10 MG/ML
4 INJECTION, SOLUTION INTRAMUSCULAR; INTRAVENOUS
Status: CANCELLED | OUTPATIENT
Start: 2025-03-12

## 2025-03-12 RX ORDER — ONDANSETRON 2 MG/ML
4 INJECTION INTRAMUSCULAR; INTRAVENOUS EVERY 30 MIN PRN
Status: CANCELLED | OUTPATIENT
Start: 2025-03-12

## 2025-03-12 RX ORDER — NALOXONE HYDROCHLORIDE 0.4 MG/ML
0.1 INJECTION, SOLUTION INTRAMUSCULAR; INTRAVENOUS; SUBCUTANEOUS
Status: CANCELLED | OUTPATIENT
Start: 2025-03-12

## 2025-03-12 RX ORDER — LIDOCAINE 40 MG/G
CREAM TOPICAL
Status: CANCELLED | OUTPATIENT
Start: 2025-03-12

## 2025-03-13 ENCOUNTER — TELEPHONE (OUTPATIENT)
Dept: FAMILY MEDICINE | Facility: OTHER | Age: 50
End: 2025-03-13

## 2025-03-13 NOTE — TELEPHONE ENCOUNTER
----- Message from Parisa VIERA sent at 3/10/2025  3:25 PM CDT -----  Regarding: Needs pre-op  Patient scheduled for colonoscopy on 3/18 with Dr. Reyes.  She needs a pre-op.  I see she has no showed/canceled the last couple of appointments.

## 2025-03-13 NOTE — TELEPHONE ENCOUNTER
3 attempts made to contact pt regarding pre-op for colonoscopy on 3/18.  3/13/25 3rd attempt - pt answered and hung up phone

## 2025-04-23 DIAGNOSIS — Z87.898 HISTORY OF SEIZURES: ICD-10-CM

## 2025-04-23 DIAGNOSIS — F33.1 MAJOR DEPRESSIVE DISORDER, RECURRENT EPISODE, MODERATE (H): ICD-10-CM

## 2025-04-23 DIAGNOSIS — Z00.00 HEALTHCARE MAINTENANCE: ICD-10-CM

## 2025-04-23 NOTE — TELEPHONE ENCOUNTER
lamoTRIgine (LAMICTAL) 200 MG tablet     Last Written Prescription Date:  11-5-24  Last Fill Quantity: 30,   # refills: 5  Last Office Visit: 11-4-24  Future Office visit:       Routing refill request to provider for review/approval because:  Drug not on the [facility], [facility] or [facility] refill protocol or controlled substance          Multiple Vitamin (TAB-A-RAJESH) TABS       Last Written Prescription Date:  5-20-24  Last Fill Quantity: 30,   # refills: 11  Last Office Visit: 11-4-24  Future Office visit:       Routing refill request to provider for review/approval because:  Drug not on the [facility], [facility] or [facility] refill protocol or controlled substance         topiramate (TOPAMAX) 50 MG tablet        Last Written Prescription Date:  11-5-24  Last Fill Quantity: 56,   # refills: 5  Last Office Visit: 11-4-24  Future Office visit:       Routing refill request to provider for review/approval because:  Drug not on the [facility], [facility] or [facility] refill protocol or controlled substance

## 2025-04-24 RX ORDER — TOPIRAMATE 50 MG/1
50 TABLET, FILM COATED ORAL 2 TIMES DAILY
Qty: 60 TABLET | Refills: 5 | Status: SHIPPED | OUTPATIENT
Start: 2025-04-24

## 2025-04-24 RX ORDER — MULTIVITAMIN WITH FOLIC ACID 400 MCG
1 TABLET ORAL DAILY
Qty: 28 TABLET | Refills: 6 | Status: SHIPPED | OUTPATIENT
Start: 2025-04-24

## 2025-04-24 RX ORDER — LAMOTRIGINE 200 MG/1
200 TABLET ORAL DAILY
Qty: 30 TABLET | Refills: 5 | Status: SHIPPED | OUTPATIENT
Start: 2025-04-24

## 2025-04-24 NOTE — TELEPHONE ENCOUNTER
LAMOTRIGINE 200 MG TABLET       Routing refill request to provider for review/approval because:  Anti-Seizure Meds Protocol  [id number]/23/2025 11:25 AM   Protocol Details   PHQ-9 score less than 5 in past 6 months.    Review Authorizing provider's last note.      TOPIRAMATE 50 MG TABLET       Routing refill request to provider for review/approval because:  Anti-Seizure Meds Protocol  [id number]/23/2025 11:25 AM   Protocol Details   PHQ-9 score less than 5 in past 6 months.    Review Authorizing provider's last note.           11/20/2023     8:44 AM 2/27/2024     8:21 AM 11/4/2024     9:58 AM   PHQ   PHQ-9 Total Score 4 5 8    Q9: Thoughts of better off dead/self-harm past 2 weeks Not at all Not at all Not at all       Patient-reported